# Patient Record
Sex: FEMALE | Race: WHITE
[De-identification: names, ages, dates, MRNs, and addresses within clinical notes are randomized per-mention and may not be internally consistent; named-entity substitution may affect disease eponyms.]

---

## 2017-04-07 ENCOUNTER — HOSPITAL ENCOUNTER (OUTPATIENT)
Dept: HOSPITAL 17 - CPRE | Age: 72
End: 2017-04-07
Attending: ORTHOPAEDIC SURGERY
Payer: MEDICARE

## 2017-04-07 ENCOUNTER — HOSPITAL ENCOUNTER (INPATIENT)
Dept: HOSPITAL 17 - HSDI | Age: 72
LOS: 9 days | Discharge: NURSING, REHAB OR LONG-TERM CARE | DRG: 470 | End: 2017-04-16
Attending: ORTHOPAEDIC SURGERY | Admitting: ORTHOPAEDIC SURGERY
Payer: MEDICARE

## 2017-04-07 VITALS — HEIGHT: 64 IN | WEIGHT: 216.49 LBS | BODY MASS INDEX: 36.96 KG/M2

## 2017-04-07 DIAGNOSIS — Z01.811: ICD-10-CM

## 2017-04-07 DIAGNOSIS — J43.9: ICD-10-CM

## 2017-04-07 DIAGNOSIS — K59.00: ICD-10-CM

## 2017-04-07 DIAGNOSIS — Z01.810: Primary | ICD-10-CM

## 2017-04-07 DIAGNOSIS — M25.50: ICD-10-CM

## 2017-04-07 DIAGNOSIS — M79.609: ICD-10-CM

## 2017-04-07 DIAGNOSIS — I10: ICD-10-CM

## 2017-04-07 DIAGNOSIS — K21.9: ICD-10-CM

## 2017-04-07 DIAGNOSIS — Z01.812: ICD-10-CM

## 2017-04-07 DIAGNOSIS — F11.20: ICD-10-CM

## 2017-04-07 DIAGNOSIS — G89.4: ICD-10-CM

## 2017-04-07 DIAGNOSIS — F32.9: ICD-10-CM

## 2017-04-07 DIAGNOSIS — Z87.891: ICD-10-CM

## 2017-04-07 DIAGNOSIS — E03.9: ICD-10-CM

## 2017-04-07 DIAGNOSIS — Z88.5: ICD-10-CM

## 2017-04-07 DIAGNOSIS — Z96.60: ICD-10-CM

## 2017-04-07 DIAGNOSIS — E66.9: ICD-10-CM

## 2017-04-07 DIAGNOSIS — Z71.3: ICD-10-CM

## 2017-04-07 DIAGNOSIS — M84.352A: ICD-10-CM

## 2017-04-07 DIAGNOSIS — M16.12: Primary | ICD-10-CM

## 2017-04-07 DIAGNOSIS — Z88.0: ICD-10-CM

## 2017-04-07 DIAGNOSIS — M10.9: ICD-10-CM

## 2017-04-07 DIAGNOSIS — M87.9: ICD-10-CM

## 2017-04-07 DIAGNOSIS — R94.31: ICD-10-CM

## 2017-04-07 DIAGNOSIS — Z88.6: ICD-10-CM

## 2017-04-07 DIAGNOSIS — Z88.2: ICD-10-CM

## 2017-04-07 LAB
ALP SERPL-CCNC: 91 U/L (ref 45–117)
ALT SERPL-CCNC: 39 U/L (ref 10–53)
ANION GAP SERPL CALC-SCNC: 9 MEQ/L (ref 5–15)
APTT BLD: 27.3 SEC (ref 24.3–30.1)
AST SERPL-CCNC: 35 U/L (ref 15–37)
BASOPHILS # BLD AUTO: 0 TH/MM3 (ref 0–0.2)
BASOPHILS NFR BLD: 0.5 % (ref 0–2)
BILIRUB SERPL-MCNC: 0.6 MG/DL (ref 0.2–1)
BUN SERPL-MCNC: 9 MG/DL (ref 7–18)
CHLORIDE SERPL-SCNC: 103 MEQ/L (ref 98–107)
COLOR UR: YELLOW
COMMENT (UR): (no result)
CULTURE IF INDICATED: (no result)
EOSINOPHIL # BLD: 0 TH/MM3 (ref 0–0.4)
EOSINOPHIL NFR BLD: 0.1 % (ref 0–4)
ERYTHROCYTE [DISTWIDTH] IN BLOOD BY AUTOMATED COUNT: 14.6 % (ref 11.6–17.2)
ERYTHROCYTE [SEDIMENTATION RATE] IN BLOOD BY WESTERGREN METHOD: 10 MM/HR (ref 0–30)
GFR SERPLBLD BASED ON 1.73 SQ M-ARVRAT: 52 ML/MIN (ref 89–?)
GLUCOSE UR STRIP-MCNC: (no result) MG/DL
HCO3 BLD-SCNC: 28.9 MEQ/L (ref 21–32)
HCT VFR BLD CALC: 44.7 % (ref 35–46)
HEMO FLAGS: (no result)
HGB UR QL STRIP: (no result)
INR PPP: 1 RATIO
KETONES UR STRIP-MCNC: (no result) MG/DL
LYMPHOCYTES # BLD AUTO: 3.1 TH/MM3 (ref 1–4.8)
LYMPHOCYTES NFR BLD AUTO: 37.9 % (ref 9–44)
MCH RBC QN AUTO: 29.1 PG (ref 27–34)
MCHC RBC AUTO-ENTMCNC: 34.2 % (ref 32–36)
MCV RBC AUTO: 85.2 FL (ref 80–100)
MONOCYTES NFR BLD: 8 % (ref 0–8)
MUCOUS THREADS #/AREA URNS LPF: (no result) /LPF
NEUTROPHILS # BLD AUTO: 4.4 TH/MM3 (ref 1.8–7.7)
NEUTROPHILS NFR BLD AUTO: 53.5 % (ref 16–70)
NITRITE UR QL STRIP: (no result)
PLATELET # BLD: 246 TH/MM3 (ref 150–450)
POTASSIUM SERPL-SCNC: 3.6 MEQ/L (ref 3.5–5.1)
PROTHROMBIN TIME: 10.9 SEC (ref 9.8–11.6)
RBC # BLD AUTO: 5.24 MIL/MM3 (ref 4–5.3)
SODIUM SERPL-SCNC: 141 MEQ/L (ref 136–145)
SP GR UR STRIP: 1.02 (ref 1–1.03)
SQUAMOUS #/AREA URNS HPF: 1 /HPF (ref 0–5)
WBC # BLD AUTO: 8.3 TH/MM3 (ref 4–11)

## 2017-04-07 PROCEDURE — 85652 RBC SED RATE AUTOMATED: CPT

## 2017-04-07 PROCEDURE — 85025 COMPLETE CBC W/AUTO DIFF WBC: CPT

## 2017-04-07 PROCEDURE — 85730 THROMBOPLASTIN TIME PARTIAL: CPT

## 2017-04-07 PROCEDURE — 86850 RBC ANTIBODY SCREEN: CPT

## 2017-04-07 PROCEDURE — 71020: CPT

## 2017-04-07 PROCEDURE — 83036 HEMOGLOBIN GLYCOSYLATED A1C: CPT

## 2017-04-07 PROCEDURE — 85610 PROTHROMBIN TIME: CPT

## 2017-04-07 PROCEDURE — 83735 ASSAY OF MAGNESIUM: CPT

## 2017-04-07 PROCEDURE — 93005 ELECTROCARDIOGRAM TRACING: CPT

## 2017-04-07 PROCEDURE — C9290 INJ, BUPIVACAINE LIPOSOME: HCPCS

## 2017-04-07 PROCEDURE — 73502 X-RAY EXAM HIP UNI 2-3 VIEWS: CPT

## 2017-04-07 PROCEDURE — 86901 BLOOD TYPING SEROLOGIC RH(D): CPT

## 2017-04-07 PROCEDURE — 94150 VITAL CAPACITY TEST: CPT

## 2017-04-07 PROCEDURE — 81001 URINALYSIS AUTO W/SCOPE: CPT

## 2017-04-07 PROCEDURE — 84439 ASSAY OF FREE THYROXINE: CPT

## 2017-04-07 PROCEDURE — 80048 BASIC METABOLIC PNL TOTAL CA: CPT

## 2017-04-07 PROCEDURE — 80061 LIPID PANEL: CPT

## 2017-04-07 PROCEDURE — 76000 FLUOROSCOPY <1 HR PHYS/QHP: CPT

## 2017-04-07 PROCEDURE — C1776 JOINT DEVICE (IMPLANTABLE): HCPCS

## 2017-04-07 PROCEDURE — 85027 COMPLETE CBC AUTOMATED: CPT

## 2017-04-07 PROCEDURE — 84443 ASSAY THYROID STIM HORMONE: CPT

## 2017-04-07 PROCEDURE — 36415 COLL VENOUS BLD VENIPUNCTURE: CPT

## 2017-04-07 PROCEDURE — 74177 CT ABD & PELVIS W/CONTRAST: CPT

## 2017-04-07 PROCEDURE — 80053 COMPREHEN METABOLIC PANEL: CPT

## 2017-04-07 PROCEDURE — 86900 BLOOD TYPING SEROLOGIC ABO: CPT

## 2017-04-07 NOTE — RADRPT
EXAM DATE/TIME:  04/07/2017 14:43 

 

[facility] COMPARISON:     

FLUOROSCOPY FOR INTERVENTIONAL PAIN, SPINAL UP TO 1 HR, October 26, 2016, 0:00.

 

                     

INDICATIONS :     

Evaluate for pneumonia, pneumothorax and communicable diseases.  Pre-op total hip replacement.

                     

 

MEDICAL HISTORY :     

None.          

 

SURGICAL HISTORY :     

None.   

 

ENCOUNTER:     

Initial                                        

 

ACUITY:     

1 day      

 

PAIN SCORE:     

0/10

 

LOCATION:     

Bilateral chest 

 

FINDINGS:     

There is elevation of the right hemidiaphragm of uncertain etiology. The lungs are clear. The bony st
ructures are grossly intact.

 

CONCLUSION:     

1. Elevated right hemidiaphragm.

2. No acute cardiopulmonary findings.

 

 

 

 Gerhard Shepherd MD on April 07, 2017 at 15:03           

Board Certified Radiologist.

 This report was verified electronically.

## 2017-04-08 NOTE — EKG
Date Performed: 04/07/2017       Time Performed: 14:04:09

 

PTAGE:      72 years

 

EKG:      Sinus rhythm 

 

 WITH SHORT MA INTERVAL MINIMAL ST DEPRESSION Compared to prior tracing no significant change BORDERL
INE ECG

 

PREVIOUS TRACING       : 09/04/1997 15.23

 

DOCTOR:   Rupesh Lopez  Interpretating Date/Time  04/08/2017 14:33:18

## 2017-04-12 VITALS
SYSTOLIC BLOOD PRESSURE: 153 MMHG | DIASTOLIC BLOOD PRESSURE: 98 MMHG | OXYGEN SATURATION: 94 % | RESPIRATION RATE: 20 BRPM | TEMPERATURE: 99.1 F | HEART RATE: 83 BPM

## 2017-04-12 VITALS
OXYGEN SATURATION: 95 % | TEMPERATURE: 96.4 F | SYSTOLIC BLOOD PRESSURE: 143 MMHG | HEART RATE: 100 BPM | RESPIRATION RATE: 18 BRPM | DIASTOLIC BLOOD PRESSURE: 82 MMHG

## 2017-04-12 PROCEDURE — 0SRB02A REPLACEMENT OF LEFT HIP JOINT WITH METAL ON POLYETHYLENE SYNTHETIC SUBSTITUTE, UNCEMENTED, OPEN APPROACH: ICD-10-PCS | Performed by: ORTHOPAEDIC SURGERY

## 2017-04-12 RX ADMIN — HYDROMORPHONE HYDROCHLORIDE PRN MG: 2 INJECTION INTRAMUSCULAR; INTRAVENOUS; SUBCUTANEOUS at 20:02

## 2017-04-12 RX ADMIN — LISINOPRIL SCH MG: 10 TABLET ORAL at 22:09

## 2017-04-12 RX ADMIN — PHENYTOIN SODIUM SCH MLS/HR: 50 INJECTION INTRAMUSCULAR; INTRAVENOUS at 18:30

## 2017-04-12 RX ADMIN — COLCHICINE SCH MG: 0.6 TABLET, FILM COATED ORAL at 22:08

## 2017-04-12 RX ADMIN — SODIUM CHLORIDE, PRESERVATIVE FREE SCH ML: 5 INJECTION INTRAVENOUS at 21:00

## 2017-04-12 NOTE — RADRPT
EXAM DATE/TIME:  04/12/2017 15:53 

 

[facility] COMPARISON:     

No previous studies available for comparison.

 

                     

INDICATIONS :     

Left total hip replacement.

                     

 

MEDICAL HISTORY :     

None.          

 

SURGICAL HISTORY :     

None.   

 

ENCOUNTER:     

Initial                                        

 

ACUITY:     

1 day      

 

PAIN SCORE:     

Non-responsive.

 

LOCATION:     

Left  hip.

 

FINDINGS:     

3 spot intraoperative fluoroscopic views of the left hip demonstrate a left total hip arthroplasty. F
emoral and acetabular components appear well-seated.

 

CONCLUSION:     

Postoperative changes left total hip arthroplasty.

 

 

 

 Mario Love MD on April 12, 2017 at 17:17           

Board Certified Radiologist.

 This report was verified electronically.

## 2017-04-12 NOTE — RADRPT
EXAM DATE/TIME:  04/12/2017 17:47 

 

[facility] COMPARISON:     

HIP LEFT (AP&LAT 2/3VWS) WO AP PELVIS, April 12, 2017, 15:53.

 

                     

INDICATIONS :     

Status post op total left hip arthroplasty.

                     

 

MEDICAL HISTORY :     

None.          

 

SURGICAL HISTORY :     

None.   

 

ENCOUNTER:     

Subsequent                                        

 

ACUITY:     

1 day      

 

PAIN SCORE:     

0/10

 

LOCATION:     

Left  Hip

 

FINDINGS:     

Patient is immediately postoperative left bipolar hip arthroplasty which appears intact and normally 
aligned. No evidence of hardware failure or other acute complication.

 

CONCLUSION:     

Expected radiographic appearance after left bipolar hip arthroplasty. No acute complication demonstra
katie.

 

 

 

 Wesly Hall MD on April 12, 2017 at 18:25           

Board Certified Radiologist.

 This report was verified electronically.

## 2017-04-12 NOTE — HHI.DCPOC
Discharge Care Plan


Diagnosis:  


(1) Status post total hip replacement, left


(2) Stress fracture of left hip


Your Health Problems Are:     Difficulty with ADL


Goals to Promote Your Health


* To prevent worsening of your condition and complications


* To maintain your health at the optimal level


Directions to Meet Your Goals


*** Take your medications as prescribed


*** Follow your dietary instruction


*** Follow activity as directed








*** Keep your appointments as scheduled


*** Take your immunizations and boosters as scheduled


*** If your symptoms worsen call your PCP, if no PCP go to Urgent Care Center 

or Emergency Room***


*** Smoking is Dangerous to Your Health. Avoid second hand smoke***


***Call the 24-hour hour crisis hotline for domestic abuse at 1-468.261.8353***








Gerhard Munguia Apr 12, 2017 18:01

## 2017-04-12 NOTE — HHI.FF
Face to Face Verification


Diagnosis:  


(1) Status post total hip replacement, left


(2) Stress fracture of left hip


Physical Therapy


Gait training, Transfer training, bed to chair


Hip:  Total hip


Left LE Weight Bearing:  WB as tolerated


Left LE Range of Motion:  Active ROM





Nursing


Nursing:  Lovenox teaching, Dressing changes


Dressing Changes:  Daily dressing change








I have seen patient Tamika Hartman on 4/12/17. My clinical findings support 

the need for the requested home health care services because:


Limited ability to care for self


High risk of falls








I certify that my clinical findings support that this patient is homebound 

because:


Post-op weakness


Unsteady gait/balance








Gerhard Munguia Apr 12, 2017 18:02

## 2017-04-12 NOTE — PD.OP
cc:   Konrad Jessica MD


__________________________________________________





Operative Report


Date of Surgery:  Apr 12, 2017


Preoperative Diagnosis:  


Left hip avascular necrosis.  Left hip severe osteoarthritis.


Postoperative Diagnosis:  


Same


Procedure:


Left total hip arthroplasty


Anesthesia:


Spinal


Surgeon:


Konrad Jessica


Assistant(s):


ALFREDO Zurita


 


The surgical procedure was assisted by my Advanced Registered Nurse 

Practitioner.  My ARNP presence was necessary throughout this case for the 

manipulation and positioning of the surgical extremity. My ARNP was assisting 

me throughout the duration of this procedure.  The skill set of an Advance 

Registered Nurse Practitioner was medically necessary to complete this 

procedure.  During the surgical case, the surgical tech was working at the back 

table and the Advance Registered Nurse Practitioner was directly assisting me.


Operation and Findings:





IMPLANT DESCRIPTION:


1. [place] Gription Cup, acetabular size 52.


2. [place] AltrX polyethylene, neutral.


4. [place] femoral stem size 11, no collar, standard offset.


5. Femoral head/neck metal, 36, 1.5.





ESTIMATED BLOOD LOSS:


200 cc.





JUSTIFICATION FOR PROCEDURE:





The patient has end-stage osteoarthritis to the hip. There is an attached 

conservative measures pathway form in the chart that describes the nonoperative 

measures that were undertaken prior to consideration of surgical management. 

The patient understood the risks and benefits of surgical management. See my 

office notes for further details.





PROCEDURE:





The patient was brought back to the operative theatre. Adequate anesthesia was 

obtained.  The patient received intravenous Ancef and vancomycin.  Note the 

patient was given a test dose prior to giving the Ancef.  The patient was 

carefully placed on the operative table.  The lower extremity was prepped and 

draped in the usual sterile fashion. Fluoroscopic images were obtained.  We 

made a standard anterior incision over the hip. We dissected through the TFL 

fascia, exposing the anterior capsule. Arthrotomy was performed in a T-shaped 

fashion.  The capsule was tagged with a #2 FiberWire. End-stage arthritis was 

identified.  No significant effusion was noted.





Osteotomy was performed through the femoral neck exposing the acetabulum.  

Remnants of the labrum were resected and osteophytes were removed. We 

sequentially reamed the acetabulum.  We trialed the hip and placed the final 

cup into position.  This was done under fluoroscopic guidance to obtain the 

appropriate inclination and anteversion.  A manhole cover was placed into the 

acetabular component.  We then placed the final polyethylene into position and 

confirmed that it was well seated.





Capsular attachments on the calcar and the inner aspect of the greater 

trochanter were resected. On the proximal aspect of the femur we used a rongeur

, and box osteotome.  Note that when we exposed the proximal femur after the 

box osteotome we did see edematous cancellous bone within the femoral neck 

which was consistent with the stress reaction/fracture that was seen on the 

most recent MRI.  We did thoroughly irrigate this area.  We followed this by 

using sequential broaches and lateralizing rasp .  We calcar planed the 

proximal femur.  Then thoroughly irrigated the wound.  We trialed the hip with 

the appropriate size stem.  We placed the final stem in to position and trialed 

again.  The hip was stable while it was externally rotated 70 degrees when the 

leg was lowered to the floor.





The final head was applied, and final fluoroscopic images were obtained. The 

wound was thoroughly irrigated again. Interarticular injection of liposomal 

bupivacaine was given. The capsule was closed with #2 FiberWire and #1 Vicryl. 

The deep fascia was closed with a #2 Stratafix, followed by 2-0 Vicryl in the 

skin and staples.





Postop plan is to weight-bear as tolerated.  DVT prophylaxis will be performed 

with SCDs, VINAY hose, early mobilization, and Lovenox followed by aspirin.








Konrad Jessica MD Apr 12, 2017 17:24

## 2017-04-13 VITALS
DIASTOLIC BLOOD PRESSURE: 70 MMHG | OXYGEN SATURATION: 97 % | RESPIRATION RATE: 16 BRPM | SYSTOLIC BLOOD PRESSURE: 138 MMHG | TEMPERATURE: 98.7 F | HEART RATE: 79 BPM

## 2017-04-13 VITALS
OXYGEN SATURATION: 92 % | DIASTOLIC BLOOD PRESSURE: 84 MMHG | RESPIRATION RATE: 17 BRPM | TEMPERATURE: 98.5 F | HEART RATE: 83 BPM | SYSTOLIC BLOOD PRESSURE: 156 MMHG

## 2017-04-13 VITALS
DIASTOLIC BLOOD PRESSURE: 59 MMHG | SYSTOLIC BLOOD PRESSURE: 130 MMHG | RESPIRATION RATE: 18 BRPM | TEMPERATURE: 98.4 F | OXYGEN SATURATION: 95 % | HEART RATE: 73 BPM

## 2017-04-13 VITALS
HEART RATE: 83 BPM | SYSTOLIC BLOOD PRESSURE: 125 MMHG | DIASTOLIC BLOOD PRESSURE: 74 MMHG | TEMPERATURE: 97.8 F | OXYGEN SATURATION: 91 % | RESPIRATION RATE: 16 BRPM

## 2017-04-13 VITALS
OXYGEN SATURATION: 93 % | HEART RATE: 80 BPM | SYSTOLIC BLOOD PRESSURE: 150 MMHG | TEMPERATURE: 98 F | RESPIRATION RATE: 18 BRPM | DIASTOLIC BLOOD PRESSURE: 97 MMHG

## 2017-04-13 VITALS
SYSTOLIC BLOOD PRESSURE: 139 MMHG | DIASTOLIC BLOOD PRESSURE: 88 MMHG | HEART RATE: 84 BPM | TEMPERATURE: 97 F | OXYGEN SATURATION: 94 % | RESPIRATION RATE: 17 BRPM

## 2017-04-13 VITALS
HEART RATE: 86 BPM | DIASTOLIC BLOOD PRESSURE: 87 MMHG | RESPIRATION RATE: 16 BRPM | OXYGEN SATURATION: 93 % | SYSTOLIC BLOOD PRESSURE: 149 MMHG | TEMPERATURE: 98.1 F

## 2017-04-13 VITALS — OXYGEN SATURATION: 94 %

## 2017-04-13 VITALS — OXYGEN SATURATION: 96 %

## 2017-04-13 LAB
ERYTHROCYTE [DISTWIDTH] IN BLOOD BY AUTOMATED COUNT: 13.8 % (ref 11.6–17.2)
HCT VFR BLD CALC: 37.4 % (ref 35–46)
MCH RBC QN AUTO: 29.3 PG (ref 27–34)
MCHC RBC AUTO-ENTMCNC: 34.4 % (ref 32–36)
MCV RBC AUTO: 85.2 FL (ref 80–100)
PLATELET # BLD: 176 TH/MM3 (ref 150–450)
RBC # BLD AUTO: 4.39 MIL/MM3 (ref 4–5.3)
REVIEW FLAG: (no result)
WBC # BLD AUTO: 10 TH/MM3 (ref 4–11)

## 2017-04-13 RX ADMIN — PHENYTOIN SODIUM SCH MLS/HR: 50 INJECTION INTRAMUSCULAR; INTRAVENOUS at 23:13

## 2017-04-13 RX ADMIN — ENOXAPARIN SODIUM SCH MG: 40 INJECTION SUBCUTANEOUS at 16:35

## 2017-04-13 RX ADMIN — LEVOTHYROXINE SODIUM SCH MCG: 25 TABLET ORAL at 08:21

## 2017-04-13 RX ADMIN — LEVOTHYROXINE SODIUM SCH MCG: 25 TABLET ORAL at 06:18

## 2017-04-13 RX ADMIN — PANTOPRAZOLE SODIUM SCH MG: 20 TABLET, DELAYED RELEASE ORAL at 08:21

## 2017-04-13 RX ADMIN — LISINOPRIL SCH MG: 20 TABLET ORAL at 08:21

## 2017-04-13 RX ADMIN — LISINOPRIL SCH MG: 10 TABLET ORAL at 21:16

## 2017-04-13 RX ADMIN — MULTIPLE VITAMINS W/ MINERALS TAB SCH TAB: TAB at 21:00

## 2017-04-13 RX ADMIN — PHENYTOIN SODIUM SCH MLS/HR: 50 INJECTION INTRAMUSCULAR; INTRAVENOUS at 13:13

## 2017-04-13 RX ADMIN — ALLOPURINOL SCH MG: 100 TABLET ORAL at 08:21

## 2017-04-13 RX ADMIN — HYDROMORPHONE HYDROCHLORIDE PRN MG: 2 INJECTION INTRAMUSCULAR; INTRAVENOUS; SUBCUTANEOUS at 00:00

## 2017-04-13 RX ADMIN — MULTIPLE VITAMINS W/ MINERALS TAB SCH TAB: TAB at 21:15

## 2017-04-13 RX ADMIN — CITALOPRAM SCH MG: 20 TABLET, FILM COATED ORAL at 08:22

## 2017-04-13 RX ADMIN — COLCHICINE SCH MG: 0.6 TABLET, FILM COATED ORAL at 21:00

## 2017-04-13 RX ADMIN — SODIUM CHLORIDE, PRESERVATIVE FREE SCH ML: 5 INJECTION INTRAVENOUS at 21:00

## 2017-04-13 RX ADMIN — SODIUM CHLORIDE, PRESERVATIVE FREE SCH ML: 5 INJECTION INTRAVENOUS at 08:22

## 2017-04-13 RX ADMIN — PHENYTOIN SODIUM SCH MLS/HR: 50 INJECTION INTRAMUSCULAR; INTRAVENOUS at 03:45

## 2017-04-13 RX ADMIN — BUDESONIDE AND FORMOTEROL FUMARATE DIHYDRATE SCH PUFF: 160; 4.5 AEROSOL RESPIRATORY (INHALATION) at 08:22

## 2017-04-13 RX ADMIN — DOCUSATE SODIUM SCH MG: 100 CAPSULE, LIQUID FILLED ORAL at 21:15

## 2017-04-13 RX ADMIN — COLCHICINE SCH MG: 0.6 TABLET, FILM COATED ORAL at 08:22

## 2017-04-13 RX ADMIN — POVIDONE-IODINE SCH APPLIC: 7.5 SOLUTION TOPICAL at 10:00

## 2017-04-13 NOTE — PD.ORT.PN
Subjective


Post Op Day #:  1


Subjective Remarks


The patient is having mild pain to the left hip.  Patient is ambulatory and 

voiding.





Objective


Vitals





 Vital Signs








  Date Time  Temp Pulse Resp B/P Pulse Ox O2 Delivery O2 Flow Rate FiO2


 


4/13/17 08:53     94   21


 


4/13/17 08:00 98.0 80 18 150/97 93   


 


4/13/17 06:06     96   21


 


4/13/17 04:00 97.0 84 17 139/88 94   


 


4/13/17 00:00 98.1 86 16 149/87 93   


 


4/12/17 21:23 96.4 100 18 143/82 95   


 


4/12/17 20:45 97.7 88 15 150/89 95 Nasal Cannula 2 


 


4/12/17 20:32   15     


 


4/12/17 20:00  86 15 153/85 94 Nasal Cannula 2 


 


4/12/17 19:30  85 15 155/86 94 Nasal Cannula 2 


 


4/12/17 19:00 97.8 83 15 159/82 94 Nasal Cannula 2 


 


4/12/17 18:45  84 14 163/88 96 Nasal Cannula 3 


 


4/12/17 18:30 97.2 85 14 170/96 95 Nasal Cannula 3 


 


4/12/17 18:15  89 13 180/98 95 Nasal Cannula 3 


 


4/12/17 18:00 96.9 88 13 166/96 94 Nasal Cannula 3 


 


4/12/17 17:45  93 13 155/97 96 Simple Mask 8 


 


4/12/17 17:35 96.8       


 


4/12/17 17:34 96.8 82 12 144/99 94 Simple Mask 8 








 I/O








 4/12/17 4/12/17 4/12/17 4/13/17 4/13/17 4/13/17





 07:00 15:00 23:00 07:00 15:00 23:00


 


Intake Total   1505 ml 1045 ml  


 


Output Total   800 ml 500 ml  


 


Balance   705 ml 545 ml  


 


      


 


Intake Oral   240 ml 360 ml  


 


IV Total   465 ml 685 ml  


 


Other   800 ml   


 


Output Urine Total   550 ml 500 ml  


 


Estimated Blood Loss   250 ml   


 


# Bowel Movements    0  








Result Diagram:  


4/13/17 0442





Procedures


Left EUSEBIO


Objective Remarks


The patient's dressing was changed with scant serosanguineous drainage.  

Incision is well approximated with surgical clips intact.  No redness or s/s of 

infection.  Calf is soft and nontender.  EHL/TA/G intact.  2+ pedal pulse.  

Minimal swelling.  + SILT.





Assessment & Plan


Ortho Post Op Day #:  1


Problem List:  


Assessment and Plan


POD #1:  Left EUSEBIO





1.  WBAT LLE


2.  Lovenox for DVT prophylaxis


3.  Ice to the left hip PRN


4.  Anticipatory discharge to SNF ([facility]) on Saturday.








Gerhard Munguia Apr 13, 2017 12:40

## 2017-04-13 NOTE — PD.CONS
HPI


Service


[facility]


Consult Requested By


Doctor Konrad Jessica


Reason for Consult


Medical management


Primary Care Physician


Juan Manuel Stack MD, PhD


Diagnoses:  


History of Present Illness


This is a pleasant 71 y/o Female who was brought in for elective surgery by 

Doctor Konrad Jessica with Diagnosis


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 

Total Hip arthroplasty, as we know 


the patient has chronic Pain syndrome and narcotic dependence, Pain medicines 

handled by Orthopedic surgery


she has Obesity, Gout, Depression, Hypothyroidism, Hypertension, COPD, Emphysema

, Complex Regional Pain


syndrome on left knee and left arm, Multiple surgeries, status post Surgery and 

no Complaint, she already elected


a rehab facility. her friend Miss Aniyah Oneilak with her at this time. she 

has chronic pain on low back area and


left knee managed with injections, until performed this procedure.





Review of Systems


Musculoskeletal:  COMPLAINS OF: Joint pain





Past Family Social History


Allergies:  


Coded Allergies:  


     Tylenol (Verified  Allergy, Severe, 4/12/17)


     Oxycontin (Verified  Allergy, Intermediate, Rash, 4/12/17)


     Penicillin (Verified  Allergy, Unknown, 4/12/17)


     Sulfa (Verified  Allergy, Unknown, 4/12/17)


Uncoded Allergies:  


     AMOBARTITAL (Allergy, Severe, combative behavior, 4/12/17)


     NOVACAINE (Allergy, Severe, Anaphylaxis, 3/25/16)


     SODIUM PENTOTHAL (Allergy, Severe, skin blisters, 4/12/17)


Past Medical History


Gout


Depression


Hypothyroidism


Hypertension


COPD/Emphysema


Chronic pain syndrome


Complex Regional Pain syndrome


Past Surgical History


Multiple Surgeries status post Trauma specially left arm, left knee


Spine surgery 3 years ago


Jaw fracture x 2


Tonsillectomy and Adenoids 


Appendectomy


Bartholin's cyst x 8


Tubal ligation


Exploratory laparotomy


Reported Medications





Reported Meds & Active Scripts


Active


Aspirin 325 Mg Tab 325 Mg PO DAILY


     Start Aspirin after Lovenox is completed.


Lovenox Inj (Enoxaparin Sodium) 40 Mg/0.4 Ml Syr 40 Mg SQ DAILY


     Start Aspirin after Lovenox is completed.


Hydromorphone (Hydromorphone HCl) 2 Mg Tab 1-2 Tab PO Q4H PRN


Reported


Vitamin D (Cholecalciferol) 400 Unit/Ml Drops 400 Units PO DAILY


Lisinopril 10 Mg Tab 10 Mg PO HS


Lisinopril 20 Mg Tab 20 Mg PO DAILY


Allopurinol 100 Mg Tab 100 Mg PO DAILY


Colchicine 0.6 Mg Tab 0.6 Mg PO BID


E-400 (Vitamin E) 400 Unit Cap 1 Units PO DAILY


Hysingla ER (Hydrocodone ER) 60 Mg Neal 1 Tab PO DAILY


Symbicort Inh (Budesonide/Formoterol Fumarate) 160-4.5 Mcg/Act Aero 2 Puff INH 

DAILY


Acidophilus (Probiotic Product) 1 Chew   


Calcium 500 + D (Calcium Carbonate-Vitamin D) 500-125 Mg-Unit Tab 1 Tab PO DAILY


Citalopram (Citalopram Hydrobromide) 10 Mg Tab 10 Mg PO DAILY


Nexium (Esomeprazole DR) 20 Mg Capdr 20 Mg PO DAILY


Levothyroxine (Levothyroxine Sodium) 25 Mcg Tab 25 Mcg PO DAILY


Active Ordered Medications





 Current Medications








 Medications


  (Trade)  Dose


 Ordered  Sig/Elizabeth


 Route  Start Time


 Stop Time Status Last Admin


 


  (Betadine 7.5%


 Scrub)  1 applic  ONCE


 TOPICAL  4/12/17 10:00


 4/15/17 09:59   


 


 


  (Zyloprim)  100 mg  DAILY


 PO  4/13/17 09:00


    4/13/17 08:21


 


 


  (Symbicort


 160-4.5 Inh)  2 puff  DAILY


 INH  4/13/17 09:00


     


 


 


  (CeleXA)  10 mg  DAILY


 PO  4/13/17 09:00


    4/13/17 08:22


 


 


  (Colchicine)  0.6 mg  BID


 PO  4/12/17 21:00


    4/13/17 08:22


 


 


  (Synthroid)  25 mcg  DAILY@06


 PO  4/13/17 06:00


    4/13/17 08:21


 


 


  (Prinivil)  10 mg  HS


 PO  4/12/17 21:00


    4/12/17 22:09


 


 


  (Prinivil)  20 mg  DAILY


 PO  4/13/17 09:00


    4/13/17 08:21


 


 


  (Protonix)  20 mg  DAILY


 PO  4/13/17 09:00


    4/13/17 08:21


 


 


 Patient Own


 Medication  PT OWN


 MED:


 Hydrocod...  DAILY


 PO  4/13/17 09:00


   Hold  


 


 


  (NS 1000 ml Inj)  1,000 ml @ 


 100 mls/hr  Q10H


 IV  4/12/17 17:13


    4/13/17 03:45


 


 


  (NS Flush)  2 ml  UNSCH  PRN


 IVF  4/12/17 17:15


     


 


 


  (NS Flush)  2 ml  BID


 IVF  4/12/17 21:00


    4/13/17 08:22


 


 


  (Lovenox Inj)  40 mg  Q24H


 SQ  4/13/17 16:30


 4/22/17 16:31   


 


 


  (Theragran M Tab)  1 tab  BID


 PO  4/13/17 21:00


 6/12/17 20:59   


 


 


  (Zofran Inj)  4 mg  Q6H  PRN


 IVP  4/12/17 17:15


    4/13/17 05:21


 


 


  (Colace)  100 mg  BID


 PO  4/13/17 21:00


     


 


 


  (Mag-Al Plus


 Susp Liq)  30 ml  Q6H  PRN


 PO  4/12/17 17:15


     


 


 


  (Ambien)  5 mg  HS  PRN


 PO  4/12/17 17:15


     


 


 


  (Dulcolax Supp)  10 mg  DAILY  PRN


 RECTAL  4/12/17 17:15


     


 


 


  (Milk Of


 Magnesia Liq)  30 ml  DAILY  PRN


 PO  4/12/17 17:15


     


 


 


  (Narcan Inj)  0.4 mg  UNSCH  PRN


 IV  4/12/17 17:15


     


 


 


  (Benadryl Inj)  25 mg  Q6H  PRN


 IV  4/12/17 17:15


     


 


 


  (Dilaudid Pf Inj)  1 mg  Q3H  PRN


 IV  4/12/17 17:30


    4/13/17 00:00


 


 


  (Dilaudid)  2 mg  Q6H  PRN


 PO  4/12/17 17:30


     


 


 


  (Dilaudid)  4 mg  Q4H  PRN


 PO  4/12/17 17:30


    4/13/17 10:11


 


 


 Miscellaneous


 Information  ALL


 NURSING


 DEPARTME...  UNSCH  PRN


 .XX  4/12/17 18:30


 4/13/17 18:29   


 








Family History


Mother status post Peritoneal Cancer


Father Metastatic prostate Cancer


Brother with bladder cancer


Social History


Lives by herself after her  passed away 2 years ago, smoked since her 19 

years


of age until 1990, denies other toxic habits, she is disable.





Physical Exam


Vital Signs





 Vital Signs








  Date Time  Temp Pulse Resp B/P Pulse Ox O2 Delivery O2 Flow Rate FiO2


 


4/13/17 08:53     94   21


 


4/13/17 08:00 98.0 80 18 150/97 93   


 


4/13/17 06:06     96   21


 


4/13/17 04:00 97.0 84 17 139/88 94   


 


4/13/17 00:00 98.1 86 16 149/87 93   


 


4/12/17 21:23 96.4 100 18 143/82 95   


 


4/12/17 20:45 97.7 88 15 150/89 95 Nasal Cannula 2 


 


4/12/17 20:32   15     


 


4/12/17 20:00  86 15 153/85 94 Nasal Cannula 2 


 


4/12/17 19:30  85 15 155/86 94 Nasal Cannula 2 


 


4/12/17 19:00 97.8 83 15 159/82 94 Nasal Cannula 2 


 


4/12/17 18:45  84 14 163/88 96 Nasal Cannula 3 


 


4/12/17 18:30 97.2 85 14 170/96 95 Nasal Cannula 3 


 


4/12/17 18:15  89 13 180/98 95 Nasal Cannula 3 


 


4/12/17 18:00 96.9 88 13 166/96 94 Nasal Cannula 3 


 


4/12/17 17:45  93 13 155/97 96 Simple Mask 8 


 


4/12/17 17:35 96.8       


 


4/12/17 17:34 96.8 82 12 144/99 94 Simple Mask 8 








Physical Exam


GENERAL: Obesity, well-developed patient, in no apparent distress.


SKIN: No rashes, ecchymoses or lesions. Cool and dry.


HEAD: Atraumatic. Normocephalic. No temporal or scalp tenderness.


EYES: Pupils equal round and reactive. Extraocular motions intact. No scleral 

icterus. No injection or drainage. 


ENT: Nose without bleeding, purulent drainage or septal hematoma. Throat 

without erythema, tonsillar hypertrophy or exudate. Uvula midline. Airway 

patent.


NECK: Trachea midline. No JVD or lymphadenopathy. Supple, nontender, no 

meningeal signs.


CARDIOVASCULAR: Regular rate and rhythm without murmurs, gallops, or rubs. 


RESPIRATORY: Decreased breath sounds bilateral, no wheezing or crackles. 


GASTROINTESTINAL: Abdomen soft, non-tender, nondistended. No hepato-splenomegaly

, or palpable masses. No guarding.


MUSCULOSKELETAL: Extremities without clubbing, cyanosis, left hip dressed. 

multiple scars on left arm and left leg. 


NEUROLOGICAL: Awake and alert. no focal deficits.


Laboratory





Laboratory Tests








Test 4/13/17





 04:42


 


White Blood Count 10.0 


 


Red Blood Count 4.39 


 


Hemoglobin 12.9 


 


Hematocrit 37.4 


 


Mean Corpuscular Volume 85.2 


 


Mean Corpuscular Hemoglobin 29.3 


 


Mean Corpuscular Hemoglobin 34.4 





Concent 


 


Red Cell Distribution Width 13.8 


 


Platelet Count 176 


 


Mean Platelet Volume 9.3 








Result Diagram:  


4/13/17 0442





Imaging





Last Impressions








Hip and Pelvis X-Ray 4/12/17 0000 Signed





Impressions: 





 Service Date/Time:  Wednesday, April 12, 2017 17:47 - CONCLUSION:  Expected 





 radiographic appearance after left bipolar hip arthroplasty. No acute 





 complication demonstrated.     Wesly Hall MD 


 


Hip X-Ray 4/12/17 0000 Signed





Impressions: 





 Service Date/Time:  Wednesday, April 12, 2017 15:53 - CONCLUSION:  

Postoperative 





 changes left total hip arthroplasty.     Mario Love MD 











Assessment and Plan


Assessment and Plan


1. 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 

total hip arthroplasty by Doctor Konrad Jessica 


2. Chronic Pain syndrome and narcotic dependence to continue her Home medicines


3. Obesity strongly recommended diet and exercise as outpatient


4. COPD Emphysema to continue Bronchodilator, Mucolytic and Incentive 

spirometry. 


5. Gout to continue home medicines


6. Hypothyroidism continue Hormonal replacement


7. Hypertension Mild uncontrol continue Home medicines clonidine as needed for 

systolic blood pressure over


    160 mm Hg. 


8. Complex Regional Pain syndrome on left knee and left arm, Multiple surgeries.





DVT prophylaxis with Lovenox





Appreciated doctor Konrad Jessica for the opportunity to be in this case.


Code Status


Full Code.


Discussed Condition With


Patient and her Friend in the room Miss Aniyah Gonzalez all questions 

answered to the best of 


my abilities.








Moe Crockett MD Apr 13, 2017 12:24

## 2017-04-14 VITALS
HEART RATE: 82 BPM | DIASTOLIC BLOOD PRESSURE: 83 MMHG | OXYGEN SATURATION: 94 % | RESPIRATION RATE: 17 BRPM | TEMPERATURE: 98.4 F | SYSTOLIC BLOOD PRESSURE: 150 MMHG

## 2017-04-14 VITALS
OXYGEN SATURATION: 94 % | DIASTOLIC BLOOD PRESSURE: 79 MMHG | RESPIRATION RATE: 16 BRPM | SYSTOLIC BLOOD PRESSURE: 134 MMHG | TEMPERATURE: 98.9 F | HEART RATE: 70 BPM

## 2017-04-14 VITALS
DIASTOLIC BLOOD PRESSURE: 79 MMHG | SYSTOLIC BLOOD PRESSURE: 153 MMHG | OXYGEN SATURATION: 93 % | RESPIRATION RATE: 17 BRPM | HEART RATE: 81 BPM | TEMPERATURE: 97.1 F

## 2017-04-14 VITALS
RESPIRATION RATE: 16 BRPM | HEART RATE: 76 BPM | TEMPERATURE: 99.6 F | SYSTOLIC BLOOD PRESSURE: 146 MMHG | DIASTOLIC BLOOD PRESSURE: 73 MMHG | OXYGEN SATURATION: 93 %

## 2017-04-14 VITALS
RESPIRATION RATE: 18 BRPM | HEART RATE: 80 BPM | SYSTOLIC BLOOD PRESSURE: 163 MMHG | TEMPERATURE: 98.1 F | DIASTOLIC BLOOD PRESSURE: 93 MMHG | OXYGEN SATURATION: 93 %

## 2017-04-14 VITALS
TEMPERATURE: 98.3 F | OXYGEN SATURATION: 92 % | HEART RATE: 82 BPM | RESPIRATION RATE: 17 BRPM | SYSTOLIC BLOOD PRESSURE: 149 MMHG | DIASTOLIC BLOOD PRESSURE: 79 MMHG

## 2017-04-14 VITALS — OXYGEN SATURATION: 97 %

## 2017-04-14 LAB
ANION GAP SERPL CALC-SCNC: 8 MEQ/L (ref 5–15)
BUN SERPL-MCNC: 10 MG/DL (ref 7–18)
CHLORIDE SERPL-SCNC: 105 MEQ/L (ref 98–107)
ERYTHROCYTE [DISTWIDTH] IN BLOOD BY AUTOMATED COUNT: 14 % (ref 11.6–17.2)
GFR SERPLBLD BASED ON 1.73 SQ M-ARVRAT: 56 ML/MIN (ref 89–?)
HCO3 BLD-SCNC: 30.5 MEQ/L (ref 21–32)
HCT VFR BLD CALC: 37.9 % (ref 35–46)
HDLC SERPL-MCNC: 48.6 MG/DL (ref 40–60)
HEMOGLOBIN A1A: 0.8 %
HEMOGLOBIN A1B: 1.9 %
HEMOGLOBIN AO: 86.2 %
HEMOGLOBIN LA1C: 1.9 %
HEMOGLOBIN P3: 3.6 %
LDLC SERPL-MCNC: 92 MG/DL (ref 0–99)
MAGNESIUM SERPL-MCNC: 2.2 MG/DL (ref 1.5–2.5)
MCH RBC QN AUTO: 28.5 PG (ref 27–34)
MCHC RBC AUTO-ENTMCNC: 33.1 % (ref 32–36)
MCV RBC AUTO: 86.3 FL (ref 80–100)
PLATELET # BLD: 170 TH/MM3 (ref 150–450)
POTASSIUM SERPL-SCNC: 4 MEQ/L (ref 3.5–5.1)
RBC # BLD AUTO: 4.39 MIL/MM3 (ref 4–5.3)
REVIEW FLAG: (no result)
SODIUM SERPL-SCNC: 143 MEQ/L (ref 136–145)
T4 FREE SERPL-MCNC: 1.06 NG/DL (ref 0.76–1.46)
WBC # BLD AUTO: 12.1 TH/MM3 (ref 4–11)

## 2017-04-14 RX ADMIN — DOCUSATE SODIUM SCH MG: 100 CAPSULE, LIQUID FILLED ORAL at 22:11

## 2017-04-14 RX ADMIN — MULTIPLE VITAMINS W/ MINERALS TAB SCH TAB: TAB at 21:00

## 2017-04-14 RX ADMIN — BUDESONIDE AND FORMOTEROL FUMARATE DIHYDRATE SCH PUFF: 160; 4.5 AEROSOL RESPIRATORY (INHALATION) at 09:00

## 2017-04-14 RX ADMIN — POVIDONE-IODINE SCH APPLIC: 7.5 SOLUTION TOPICAL at 09:43

## 2017-04-14 RX ADMIN — COLCHICINE SCH MG: 0.6 TABLET, FILM COATED ORAL at 08:38

## 2017-04-14 RX ADMIN — MULTIPLE VITAMINS W/ MINERALS TAB SCH TAB: TAB at 08:35

## 2017-04-14 RX ADMIN — CITALOPRAM SCH MG: 20 TABLET, FILM COATED ORAL at 08:35

## 2017-04-14 RX ADMIN — SODIUM CHLORIDE, PRESERVATIVE FREE SCH ML: 5 INJECTION INTRAVENOUS at 09:00

## 2017-04-14 RX ADMIN — ALLOPURINOL SCH MG: 100 TABLET ORAL at 08:35

## 2017-04-14 RX ADMIN — PHENYTOIN SODIUM SCH MLS/HR: 50 INJECTION INTRAMUSCULAR; INTRAVENOUS at 09:13

## 2017-04-14 RX ADMIN — PANTOPRAZOLE SODIUM SCH MG: 20 TABLET, DELAYED RELEASE ORAL at 08:35

## 2017-04-14 RX ADMIN — LISINOPRIL SCH MG: 10 TABLET ORAL at 22:11

## 2017-04-14 RX ADMIN — SODIUM CHLORIDE, PRESERVATIVE FREE SCH ML: 5 INJECTION INTRAVENOUS at 21:00

## 2017-04-14 RX ADMIN — LISINOPRIL SCH MG: 20 TABLET ORAL at 08:35

## 2017-04-14 RX ADMIN — COLCHICINE SCH MG: 0.6 TABLET, FILM COATED ORAL at 21:00

## 2017-04-14 RX ADMIN — DOCUSATE SODIUM SCH MG: 100 CAPSULE, LIQUID FILLED ORAL at 08:35

## 2017-04-14 RX ADMIN — ENOXAPARIN SODIUM SCH MG: 40 INJECTION SUBCUTANEOUS at 16:33

## 2017-04-14 RX ADMIN — LEVOTHYROXINE SODIUM SCH MCG: 25 TABLET ORAL at 06:00

## 2017-04-14 NOTE — PD.ORT.PN
Subjective


Post Op Day #:  2


Subjective Remarks


The patient is resting in bed with moderate pain.  Patient is ambulatory.  

Today is better than yesterday per patient.





Objective


Vitals





 Vital Signs








  Date Time  Temp Pulse Resp B/P Pulse Ox O2 Delivery O2 Flow Rate FiO2


 


4/14/17 12:12 99.6 76 16 146/73 93   


 


4/14/17 07:55     97   21


 


4/14/17 07:46 98.1 80 18 163/93 93   


 


4/14/17 00:00 97.1 81 17 153/79 93   


 


4/13/17 20:25 98.4 73 18 130/59 95   


 


4/13/17 20:20 97.8 83 16 125/74 91   


 


4/13/17 18:58      Room Air  


 


4/13/17 16:20 98.5 83 17 156/84 92   








 I/O








 4/13/17 4/13/17 4/13/17 4/14/17 4/14/17 4/14/17





 07:00 15:00 23:00 07:00 15:00 23:00


 


Intake Total 1045 ml 1080 ml 360 ml 240 ml  


 


Output Total 500 ml     


 


Balance 545 ml 1080 ml 360 ml 240 ml  


 


      


 


Intake Oral 360 ml 1080 ml 360 ml 240 ml  


 


IV Total 685 ml     


 


Output Urine Total 500 ml     


 


# Voids  8 1 2  


 


# Bowel Movements 0  0 0  








Result Diagram:  


4/14/17 0516 4/14/17 0516





Procedures


Left EUSEBIO


Objective Remarks


The patient's dressing is C/D/I.  Calf is soft and nontender.  EHL/TA/G intact.

  2+ pedal pulse.  Minimal swelling.  + SILT.





Assessment & Plan


Ortho Post Op Day #:  2


Problem List:  


Assessment and Plan


POD #2:  Left EUSEBIO





1.  WBAT LLE


2.  Lovenox for DVT prophylaxis


3.  Ice to the left hip PRN


4.  Anticipatory discharge to SNF ([facility]) on Saturday.








Gerhard Munguia Apr 14, 2017 15:34

## 2017-04-14 NOTE — HHI.PR
Subjective


Remarks


This is a pleasant 71 y/o Female who was brought in for elective surgery by 

Doctor Konrad Jessica with Diagnosis


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 

Total Hip arthroplasty, as we know 


the patient has chronic Pain syndrome and narcotic dependence, Pain medicines 

handled by Orthopedic surgery


she has Obesity, Gout, Depression, Hypothyroidism, Hypertension, COPD, Emphysema

, Complex Regional Pain


syndrome on left knee and left arm, Multiple surgeries, status post Surgery and 

no Complaint, she already elected


a rehab facility. her friend Miss Aniyah Gonzalez with her at this time. she 

has chronic pain on low back area and


left knee managed with injections, until performed this procedure.





4/14: Seen in her bedroom in the presence of her friend Miss Aniyah Gonzalez

, no Nausea, vomit or diarrhea. 


         working with Physical therapy, do not complain of pain, Laboratory 

reviewed.





Objective





 Vital Signs








  Date Time  Temp Pulse Resp B/P Pulse Ox O2 Delivery O2 Flow Rate FiO2


 


4/14/17 07:55     97   21


 


4/14/17 07:46 98.1 80 18 163/93 93   


 


4/14/17 00:00 97.1 81 17 153/79 93   


 


4/13/17 20:25 98.4 73 18 130/59 95   


 


4/13/17 20:20 97.8 83 16 125/74 91   


 


4/13/17 18:58      Room Air  


 


4/13/17 16:20 98.5 83 17 156/84 92   


 


4/13/17 12:00 98.7 79 16 138/70 97   








 I/O








 4/13/17 4/13/17 4/13/17 4/14/17 4/14/17 4/14/17





 07:00 15:00 23:00 07:00 15:00 23:00


 


Intake Total 1045 ml 1080 ml 360 ml 240 ml  


 


Output Total 500 ml     


 


Balance 545 ml 1080 ml 360 ml 240 ml  


 


      


 


Intake Oral 360 ml 1080 ml 360 ml 240 ml  


 


IV Total 685 ml     


 


Output Urine Total 500 ml     


 


# Voids  8 1 2  


 


# Bowel Movements 0  0 0  








Result Diagram:  


4/14/17 0516                                                                   

             4/14/17 0516





Imaging





Last Impressions








Hip and Pelvis X-Ray 4/12/17 0000 Signed





Impressions: 





 Service Date/Time:  Wednesday, April 12, 2017 17:47 - CONCLUSION:  Expected 





 radiographic appearance after left bipolar hip arthroplasty. No acute 





 complication demonstrated.     Wesly Hall MD 


 


Hip X-Ray 4/12/17 0000 Signed





Impressions: 





 Service Date/Time:  Wednesday, April 12, 2017 15:53 - CONCLUSION:  

Postoperative 





 changes left total hip arthroplasty.     Mario Love MD 








Procedures


status post Left total hip arthroplasty by Doctor Konrad Jessica 4/13/17


Other Results





 Laboratory Tests








Test 4/12/17 4/14/17





 10:45 05:16


 


Blood Type O POSITIVE  


 


Antibody Screen NEGATIVE  


 


Blood Bank Comment   


 


White Blood Count  12.1 TH/MM3


 


Red Blood Count  4.39 MIL/MM3


 


Hemoglobin  12.5 GM/DL


 


Hematocrit  37.9 %


 


Mean Corpuscular Volume  86.3 FL


 


Mean Corpuscular Hemoglobin  28.5 PG


 


Mean Corpuscular Hemoglobin  33.1 %





Concent  


 


Red Cell Distribution Width  14.0 %


 


Platelet Count  170 TH/MM3


 


Mean Platelet Volume  9.7 FL


 


Sodium Level  143 MEQ/L


 


Potassium Level  4.0 MEQ/L


 


Chloride Level  105 MEQ/L


 


Carbon Dioxide Level  30.5 MEQ/L


 


Anion Gap  8 MEQ/L


 


Blood Urea Nitrogen  10 MG/DL


 


Creatinine  0.97 MG/DL


 


Estimat Glomerular Filtration  56 ML/MIN





Rate  


 


Random Glucose  114 MG/DL


 


Calcium Level  8.7 MG/DL


 


Magnesium Level  2.2 MG/DL


 


Triglycerides Level  90 MG/DL


 


Cholesterol Level  159 MG/DL


 


LDL Cholesterol  92 MG/DL


 


HDL Cholesterol  48.6 MG/DL


 


Cholesterol/HDL Ratio  3.27 RATIO


 


Free Thyroxine  1.06 NG/DL


 


Thyroid Stimulating Hormone  0.420 uIU/ML





3rd Gen  








Objective Remarks


GENERAL: Obesity, well-developed patient, in no apparent distress.


SKIN: No rashes, ecchymoses or lesions. Cool and dry.


HEAD: Atraumatic. Normocephalic. No temporal or scalp tenderness.


EYES: Pupils equal round and reactive. Extraocular motions intact. No scleral 

icterus. No injection or drainage. 


ENT: Nose without bleeding, purulent drainage or septal hematoma. Throat 

without erythema, tonsillar hypertrophy or exudate. Uvula midline. Airway 

patent.


NECK: Trachea midline. No JVD or lymphadenopathy. Supple, nontender, no 

meningeal signs.


CARDIOVASCULAR: Regular rate and rhythm without murmurs, gallops, or rubs. 


RESPIRATORY: Decreased breath sounds bilateral, no wheezing or crackles. 


GASTROINTESTINAL: Abdomen soft, non-tender, nondistended. No hepato-splenomegaly

, or palpable masses. No guarding.


MUSCULOSKELETAL: Extremities without clubbing, cyanosis, left hip dressed. 

multiple scars on left arm and left leg. 


NEUROLOGICAL: Awake and alert. no focal deficits.


Medications and IVs





 Current Medications








 Medications


  (Trade)  Dose


 Ordered  Sig/Elizabeth


 Route  Start Time


 Stop Time Status Last Admin


 


  (Betadine 7.5%


 Scrub)  1 applic  ONCE


 TOPICAL  4/12/17 10:00


 4/15/17 09:59   


 


 


  (Zyloprim)  100 mg  DAILY


 PO  4/13/17 09:00


    4/14/17 08:35


 


 


  (Symbicort


 160-4.5 Inh)  2 puff  DAILY


 INH  4/13/17 09:00


     


 


 


  (CeleXA)  10 mg  DAILY


 PO  4/13/17 09:00


    4/14/17 08:35


 


 


  (Colchicine)  0.6 mg  BID


 PO  4/12/17 21:00


    4/14/17 08:38


 


 


  (Synthroid)  25 mcg  DAILY@06


 PO  4/13/17 06:00


    4/14/17 06:00


 


 


  (Prinivil)  10 mg  HS


 PO  4/12/17 21:00


    4/13/17 21:16


 


 


  (Prinivil)  20 mg  DAILY


 PO  4/13/17 09:00


    4/14/17 08:35


 


 


  (Protonix)  20 mg  DAILY


 PO  4/13/17 09:00


    4/14/17 08:35


 


 


 Patient Own


 Medication  PT OWN


 MED:


 Hydrocod...  DAILY


 PO  4/13/17 09:00


   Hold  


 


 


  (NS Flush)  2 ml  UNSCH  PRN


 IVF  4/12/17 17:15


     


 


 


  (NS Flush)  2 ml  BID


 IVF  4/12/17 21:00


    4/14/17 09:00


 


 


  (Lovenox Inj)  40 mg  Q24H


 SQ  4/13/17 16:30


 4/22/17 16:31  4/13/17 16:35


 


 


  (Theragran M Tab)  1 tab  BID


 PO  4/13/17 21:00


 6/12/17 20:59   


 


 


  (Zofran Inj)  4 mg  Q6H  PRN


 IVP  4/12/17 17:15


    4/13/17 05:21


 


 


  (Colace)  100 mg  BID


 PO  4/13/17 21:00


    4/14/17 08:35


 


 


  (Mag-Al Plus


 Susp Liq)  30 ml  Q6H  PRN


 PO  4/12/17 17:15


     


 


 


  (Ambien)  5 mg  HS  PRN


 PO  4/12/17 17:15


     


 


 


  (Dulcolax Supp)  10 mg  DAILY  PRN


 RECTAL  4/12/17 17:15


     


 


 


  (Milk Of


 Magnesia Liq)  30 ml  DAILY  PRN


 PO  4/12/17 17:15


     


 


 


  (Narcan Inj)  0.4 mg  UNSCH  PRN


 IV  4/12/17 17:15


     


 


 


  (Benadryl Inj)  25 mg  Q6H  PRN


 IV  4/12/17 17:15


    4/14/17 14:21


 


 


  (Dilaudid)  2 mg  Q6H  PRN


 PO  4/12/17 17:30


     


 


 


  (Dilaudid)  4 mg  Q4H  PRN


 PO  4/12/17 17:30


    4/14/17 14:21


 


 


  (Dilaudid Pf Inj)  1 mg  Q3H  PRN


 IV  4/13/17 14:15


     


 











A/P


Assessment and Plan


1. 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 

total hip arthroplasty by Doctor Konrad Jessica 


    04/13/17 improving post op 


2. Chronic Pain syndrome and narcotic dependence to continue her Home medicines


3. Obesity strongly recommended diet and exercise as outpatient


4. COPD Emphysema to continue Bronchodilator, Mucolytic and Incentive 

spirometry. Non exacerbated. 


5. Gout to continue home medicines


6. Hypothyroidism continue Hormonal replacement


7. Hypertension Mild uncontrol she is eating and drinking well removed IV 

fluids. 


8. Complex Regional Pain syndrome on left knee and left arm, Multiple surgeries.





DVT prophylaxis with Lovenox





Code Status


Full Code.


Discussed Condition With


Patient and her Friend in the room Miss Aniyah Karimimarylinak all questions 

answered to the best of 


my abilities.


Discharge Planning


Expected by tomorrow








Moe Crockett MD Apr 14, 2017 11:08

## 2017-04-15 VITALS
OXYGEN SATURATION: 92 % | DIASTOLIC BLOOD PRESSURE: 87 MMHG | SYSTOLIC BLOOD PRESSURE: 151 MMHG | TEMPERATURE: 98 F | RESPIRATION RATE: 18 BRPM | HEART RATE: 86 BPM

## 2017-04-15 VITALS
SYSTOLIC BLOOD PRESSURE: 171 MMHG | OXYGEN SATURATION: 93 % | HEART RATE: 88 BPM | DIASTOLIC BLOOD PRESSURE: 89 MMHG | RESPIRATION RATE: 18 BRPM | TEMPERATURE: 98 F

## 2017-04-15 VITALS
SYSTOLIC BLOOD PRESSURE: 160 MMHG | HEART RATE: 88 BPM | OXYGEN SATURATION: 93 % | TEMPERATURE: 98.5 F | DIASTOLIC BLOOD PRESSURE: 85 MMHG | RESPIRATION RATE: 18 BRPM

## 2017-04-15 VITALS
OXYGEN SATURATION: 91 % | SYSTOLIC BLOOD PRESSURE: 182 MMHG | RESPIRATION RATE: 17 BRPM | DIASTOLIC BLOOD PRESSURE: 89 MMHG | TEMPERATURE: 99.5 F | HEART RATE: 94 BPM

## 2017-04-15 LAB
ERYTHROCYTE [DISTWIDTH] IN BLOOD BY AUTOMATED COUNT: 14.4 % (ref 11.6–17.2)
HCT VFR BLD CALC: 37.9 % (ref 35–46)
MCH RBC QN AUTO: 29.2 PG (ref 27–34)
MCHC RBC AUTO-ENTMCNC: 33.7 % (ref 32–36)
MCV RBC AUTO: 86.7 FL (ref 80–100)
PLATELET # BLD: 193 TH/MM3 (ref 150–450)
RBC # BLD AUTO: 4.37 MIL/MM3 (ref 4–5.3)
REVIEW FLAG: (no result)
WBC # BLD AUTO: 10.2 TH/MM3 (ref 4–11)

## 2017-04-15 RX ADMIN — LISINOPRIL SCH MG: 20 TABLET ORAL at 09:42

## 2017-04-15 RX ADMIN — CITALOPRAM SCH MG: 20 TABLET, FILM COATED ORAL at 09:40

## 2017-04-15 RX ADMIN — MULTIPLE VITAMINS W/ MINERALS TAB SCH TAB: TAB at 20:44

## 2017-04-15 RX ADMIN — LISINOPRIL SCH MG: 10 TABLET ORAL at 20:44

## 2017-04-15 RX ADMIN — POLYETHYLENE GLYCOL 3350 SCH GM: 17 POWDER, FOR SOLUTION ORAL at 09:00

## 2017-04-15 RX ADMIN — SODIUM CHLORIDE, PRESERVATIVE FREE SCH ML: 5 INJECTION INTRAVENOUS at 19:30

## 2017-04-15 RX ADMIN — LEVOTHYROXINE SODIUM SCH MCG: 25 TABLET ORAL at 06:56

## 2017-04-15 RX ADMIN — PANTOPRAZOLE SODIUM SCH MG: 20 TABLET, DELAYED RELEASE ORAL at 09:42

## 2017-04-15 RX ADMIN — DOCUSATE SODIUM SCH MG: 100 CAPSULE, LIQUID FILLED ORAL at 09:43

## 2017-04-15 RX ADMIN — BUDESONIDE AND FORMOTEROL FUMARATE DIHYDRATE SCH PUFF: 160; 4.5 AEROSOL RESPIRATORY (INHALATION) at 09:00

## 2017-04-15 RX ADMIN — SODIUM CHLORIDE, PRESERVATIVE FREE SCH ML: 5 INJECTION INTRAVENOUS at 09:00

## 2017-04-15 RX ADMIN — ALLOPURINOL SCH MG: 100 TABLET ORAL at 09:41

## 2017-04-15 RX ADMIN — DOCUSATE SODIUM SCH MG: 100 CAPSULE, LIQUID FILLED ORAL at 20:44

## 2017-04-15 RX ADMIN — COLCHICINE SCH MG: 0.6 TABLET, FILM COATED ORAL at 20:44

## 2017-04-15 RX ADMIN — MULTIPLE VITAMINS W/ MINERALS TAB SCH TAB: TAB at 09:00

## 2017-04-15 RX ADMIN — COLCHICINE SCH MG: 0.6 TABLET, FILM COATED ORAL at 09:00

## 2017-04-15 RX ADMIN — ENOXAPARIN SODIUM SCH MG: 40 INJECTION SUBCUTANEOUS at 17:41

## 2017-04-15 NOTE — HHI.PR
Subjective


Remarks


This is a pleasant 71 y/o Female who was brought in for elective surgery by 

Doctor Konrad Jessica with Diagnosis


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 

Total Hip arthroplasty, as we know 


the patient has chronic Pain syndrome and narcotic dependence, Pain medicines 

handled by Orthopedic surgery


she has Obesity, Gout, Depression, Hypothyroidism, Hypertension, COPD, Emphysema

, Complex Regional Pain


syndrome on left knee and left arm, Multiple surgeries, status post Surgery and 

no Complaint, she already elected


a rehab facility. her friend Miss Aniyah Oneilak with her at this time. she 

has chronic pain on low back area and


left knee managed with injections, until performed this procedure.





4/15: Seen in her bedroom in the presence of her Nurse Miss Goodson and 

Physical Therapy specialist, working fine


        with PT, has no Nausea, vomit or diarrhea but she has Constipation, 

will add Lactulose will go today to Home 


        with [facility]





Objective





 Vital Signs








  Date Time  Temp Pulse Resp B/P Pulse Ox O2 Delivery O2 Flow Rate FiO2


 


4/14/17 23:30 98.3 82 17 149/79 92   


 


4/14/17 20:10 98.4 82 17 150/83 94   


 


4/14/17 19:06      Room Air  


 


4/14/17 16:02 98.9 70 16 134/79 94   


 


4/14/17 12:12 99.6 76 16 146/73 93   








 I/O








 4/14/17 4/14/17 4/14/17 4/15/17 4/15/17 4/15/17





 07:00 15:00 23:00 07:00 15:00 23:00


 


Intake Total 240 ml  480 ml 480 ml  


 


Balance 240 ml  480 ml 480 ml  


 


      


 


Intake Oral 240 ml  480 ml 480 ml  


 


# Voids 2  1 4  


 


# Bowel Movements 0  0 0  








Result Diagram:  


4/15/17 0650                                                                   

             4/14/17 0516





Imaging





Last Impressions








Hip and Pelvis X-Ray 4/12/17 0000 Signed





Impressions: 





 Service Date/Time:  Wednesday, April 12, 2017 17:47 - CONCLUSION:  Expected 





 radiographic appearance after left bipolar hip arthroplasty. No acute 





 complication demonstrated.     Wesly Hall MD 


 


Hip X-Ray 4/12/17 0000 Signed





Impressions: 





 Service Date/Time:  Wednesday, April 12, 2017 15:53 - CONCLUSION:  

Postoperative 





 changes left total hip arthroplasty.     Mario Love MD 








Procedures


status post Left total hip arthroplasty by Doctor Konrad Jessica 4/13/17


Other Results





 Laboratory Tests








Test 4/12/17 4/14/17 4/15/17





 10:45 05:16 06:50


 


Blood Type O POSITIVE   


 


Antibody Screen NEGATIVE   


 


Blood Bank Comment    


 


Sodium Level  143 MEQ/L 


 


Potassium Level  4.0 MEQ/L 


 


Chloride Level  105 MEQ/L 


 


Carbon Dioxide Level  30.5 MEQ/L 


 


Anion Gap  8 MEQ/L 


 


Blood Urea Nitrogen  10 MG/DL 


 


Creatinine  0.97 MG/DL 


 


Estimat Glomerular Filtration  56 ML/MIN 





Rate   


 


Random Glucose  114 MG/DL 


 


Hemoglobin A1c  5.2 % 


 


Calcium Level  8.7 MG/DL 


 


Magnesium Level  2.2 MG/DL 


 


Triglycerides Level  90 MG/DL 


 


Cholesterol Level  159 MG/DL 


 


LDL Cholesterol  92 MG/DL 


 


HDL Cholesterol  48.6 MG/DL 


 


Cholesterol/HDL Ratio  3.27 RATIO 


 


Free Thyroxine  1.06 NG/DL 


 


Thyroid Stimulating Hormone  0.420 uIU/ML 





3rd Gen   


 


White Blood Count   10.2 TH/MM3


 


Red Blood Count   4.37 MIL/MM3


 


Hemoglobin   12.7 GM/DL


 


Hematocrit   37.9 %


 


Mean Corpuscular Volume   86.7 FL


 


Mean Corpuscular Hemoglobin   29.2 PG


 


Mean Corpuscular Hemoglobin   33.7 %





Concent   


 


Red Cell Distribution Width   14.4 %


 


Platelet Count   193 TH/MM3


 


Mean Platelet Volume   9.7 FL








Objective Remarks


GENERAL: Obesity, well-developed patient, in no apparent distress.


SKIN: No rashes, ecchymoses or lesions. Cool and dry.


HEAD: Atraumatic. Normocephalic. No temporal or scalp tenderness.


EYES: Pupils equal round and reactive. Extraocular motions intact. No scleral 

icterus. No injection or drainage. 


ENT: Nose without bleeding, purulent drainage or septal hematoma. Throat 

without erythema, tonsillar hypertrophy or exudate. Uvula midline. Airway 

patent.


NECK: Trachea midline. No JVD or lymphadenopathy. Supple, nontender, no 

meningeal signs.


CARDIOVASCULAR: Regular rate and rhythm without murmurs, gallops, or rubs. 


RESPIRATORY: Decreased breath sounds bilateral, no wheezing or crackles. 


GASTROINTESTINAL: Abdomen soft, non-tender, nondistended. No hepato-splenomegaly

, or palpable masses. No guarding.


MUSCULOSKELETAL: Extremities without clubbing, cyanosis, left hip dressed. 

multiple scars on left arm and left leg. 


NEUROLOGICAL: Awake and alert. no focal deficits.


Medications and IVs





 Current Medications








 Medications


  (Trade)  Dose


 Ordered  Sig/Elizabeth


 Route  Start Time


 Stop Time Status Last Admin


 


  (Betadine 7.5%


 Scrub)  1 applic  ONCE


 TOPICAL  4/12/17 10:00


 4/15/17 09:59   


 


 


  (Zyloprim)  100 mg  DAILY


 PO  4/13/17 09:00


    4/15/17 09:41


 


 


  (Symbicort


 160-4.5 Inh)  2 puff  DAILY


 INH  4/13/17 09:00


     


 


 


  (CeleXA)  10 mg  DAILY


 PO  4/13/17 09:00


    4/15/17 09:40


 


 


  (Colchicine)  0.6 mg  BID


 PO  4/12/17 21:00


    4/15/17 09:00


 


 


  (Synthroid)  25 mcg  DAILY@06


 PO  4/13/17 06:00


    4/15/17 06:56


 


 


  (Prinivil)  10 mg  HS


 PO  4/12/17 21:00


    4/14/17 22:11


 


 


  (Prinivil)  20 mg  DAILY


 PO  4/13/17 09:00


    4/15/17 09:42


 


 


  (Protonix)  20 mg  DAILY


 PO  4/13/17 09:00


    4/15/17 09:42


 


 


 Patient Own


 Medication  PT OWN


 MED:


 Hydrocod...  DAILY


 PO  4/13/17 09:00


   Hold  


 


 


  (NS Flush)  2 ml  UNSCH  PRN


 IVF  4/12/17 17:15


     


 


 


  (NS Flush)  2 ml  BID


 IVF  4/12/17 21:00


    4/15/17 09:00


 


 


  (Lovenox Inj)  40 mg  Q24H


 SQ  4/13/17 16:30


 4/22/17 16:31  4/14/17 16:33


 


 


  (Theragran M Tab)  1 tab  BID


 PO  4/13/17 21:00


 6/12/17 20:59   


 


 


  (Zofran Inj)  4 mg  Q6H  PRN


 IVP  4/12/17 17:15


    4/13/17 05:21


 


 


  (Colace)  100 mg  BID


 PO  4/13/17 21:00


    4/15/17 09:43


 


 


  (Mag-Al Plus


 Susp Liq)  30 ml  Q6H  PRN


 PO  4/12/17 17:15


     


 


 


  (Ambien)  5 mg  HS  PRN


 PO  4/12/17 17:15


     


 


 


  (Dulcolax Supp)  10 mg  DAILY  PRN


 RECTAL  4/12/17 17:15


    4/15/17 09:45


 


 


  (Milk Of


 Magnesia Liq)  30 ml  DAILY  PRN


 PO  4/12/17 17:15


     


 


 


  (Narcan Inj)  0.4 mg  UNSCH  PRN


 IV  4/12/17 17:15


     


 


 


  (Benadryl Inj)  25 mg  Q6H  PRN


 IV  4/12/17 17:15


    4/15/17 06:57


 


 


  (Dilaudid)  2 mg  Q6H  PRN


 PO  4/12/17 17:30


     


 


 


  (Dilaudid)  4 mg  Q4H  PRN


 PO  4/12/17 17:30


    4/15/17 06:57


 


 


  (Dilaudid Pf Inj)  1 mg  Q3H  PRN


 IV  4/13/17 14:15


     


 


 


  (Miralax)  17 gm  DAILY


 PO  4/15/17 09:00


     


 











A/P


Assessment and Plan


1. 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 

total hip arthroplasty by Doctor Konrad Jessica 


    04/13/17 improving post op 


2. Chronic Pain syndrome and narcotic dependence to continue her Home medicines


3. Obesity strongly recommended diet and exercise as outpatient


4. COPD Emphysema to continue Bronchodilator, Mucolytic and Incentive 

spirometry. Non exacerbated. 


5. Gout to continue home medicines


6. Hypothyroidism continue Hormonal replacement


7. Hypertension Mild uncontrol she is eating and drinking well removed IV 

fluids. 


8. Complex Regional Pain syndrome on left knee and left arm, Multiple surgeries.


9. Constipation added Lactulose





DVT prophylaxis with Lovenox





Code Status


Full Code.


Discussed Condition With


Patient, Nurse Miss Goodson and Physical Therapy specialist,  all questions 

answered to the best of 


my abilities.


Discharge Planning


Expected today after she has her BM.


Okay from Medicine standpoint to go home today








Moe Crockett MD Apr 15, 2017 09:49

## 2017-04-15 NOTE — RADRPT
EXAM DATE/TIME:  04/15/2017 21:59 

 

[facility] COMPARISON:     

No previous studies available for comparison.

 

 

INDICATIONS :     

Obstruction.

                      

 

IV CONTRAST:     

95 cc Omnipaque 350 (iohexol) IV 

 

 

ORAL CONTRAST:      

Prescribed oral contrast ingested.

                      

 

RADIATION DOSE:     

19.91 CTDIvol (mGy) 

 

 

MEDICAL HISTORY :     

Cardiovascular disease. Chronic obstructive pulmonary disease. Renal cyst. Liver cyst.

 

SURGICAL HISTORY :      

Appendectomy. Left hip replacement.

 

ENCOUNTER:      

Initial

 

ACUITY:      

1 day

 

PAIN SCALE:      

6/10

 

LOCATION:       

Bilateral  abdomen

 

TECHNIQUE:     

Volumetric scanning of the abdomen and pelvis was performed.  Using automated exposure control and ad
justment of the mA and/or kV according to patient size, radiation dose was kept as low as reasonably 
achievable to obtain optimal diagnostic quality images. 

 

FINDINGS:     

There are postoperative changes of left total hip replacement. There is still some fluid soft tissues
. Skin staples present laterally.

 

There is atelectasis at the right lung base with elevated right hemidiaphragm. Left lung base is charlie
r except for minimal scarring.

 

No acute findings in the liver, spleen, adrenals, kidneys or pancreas. Right renal cysts present. No 
calcified gallstones or biliary ductal dilatation.

 

No pelvic masses. Trace air within the bladder. No bowel obstruction or adenopathy.

 

CONCLUSION:     

1. Postoperative left hip replacement recently with edematous change and locules of air within the so
ft tissues and skin staples laterally.

2.  No acute findings within the abdomen and pelvis. Specifically no bowel obstruction, free air or f
ree fluid.

 

 

 

 Wilfredo So MD on April 15, 2017 at 22:27           

Board Certified Radiologist.

 This report was verified electronically.

## 2017-04-16 VITALS
DIASTOLIC BLOOD PRESSURE: 87 MMHG | OXYGEN SATURATION: 99 % | TEMPERATURE: 96.9 F | SYSTOLIC BLOOD PRESSURE: 178 MMHG | RESPIRATION RATE: 16 BRPM | HEART RATE: 80 BPM

## 2017-04-16 VITALS
RESPIRATION RATE: 18 BRPM | OXYGEN SATURATION: 95 % | DIASTOLIC BLOOD PRESSURE: 79 MMHG | HEART RATE: 94 BPM | TEMPERATURE: 98 F | SYSTOLIC BLOOD PRESSURE: 164 MMHG

## 2017-04-16 VITALS
HEART RATE: 80 BPM | RESPIRATION RATE: 18 BRPM | DIASTOLIC BLOOD PRESSURE: 78 MMHG | OXYGEN SATURATION: 92 % | TEMPERATURE: 98 F | SYSTOLIC BLOOD PRESSURE: 160 MMHG

## 2017-04-16 RX ADMIN — LISINOPRIL SCH MG: 20 TABLET ORAL at 09:41

## 2017-04-16 RX ADMIN — COLCHICINE SCH MG: 0.6 TABLET, FILM COATED ORAL at 09:41

## 2017-04-16 RX ADMIN — POLYETHYLENE GLYCOL 3350 SCH GM: 17 POWDER, FOR SOLUTION ORAL at 09:42

## 2017-04-16 RX ADMIN — SODIUM CHLORIDE, PRESERVATIVE FREE SCH ML: 5 INJECTION INTRAVENOUS at 09:00

## 2017-04-16 RX ADMIN — ENOXAPARIN SODIUM SCH MG: 40 INJECTION SUBCUTANEOUS at 15:56

## 2017-04-16 RX ADMIN — ALLOPURINOL SCH MG: 100 TABLET ORAL at 09:41

## 2017-04-16 RX ADMIN — MULTIPLE VITAMINS W/ MINERALS TAB SCH TAB: TAB at 09:00

## 2017-04-16 RX ADMIN — DOCUSATE SODIUM SCH MG: 100 CAPSULE, LIQUID FILLED ORAL at 09:40

## 2017-04-16 RX ADMIN — BUDESONIDE AND FORMOTEROL FUMARATE DIHYDRATE SCH PUFF: 160; 4.5 AEROSOL RESPIRATORY (INHALATION) at 09:00

## 2017-04-16 RX ADMIN — PANTOPRAZOLE SODIUM SCH MG: 20 TABLET, DELAYED RELEASE ORAL at 09:41

## 2017-04-16 RX ADMIN — CITALOPRAM SCH MG: 20 TABLET, FILM COATED ORAL at 09:43

## 2017-04-16 RX ADMIN — LEVOTHYROXINE SODIUM SCH MCG: 25 TABLET ORAL at 06:22

## 2017-04-16 NOTE — PD.ORT.PN
Subjective


Post Op Day #:  4


Subjective Remarks


Patient laying comfortably in bed. CT of abdomen performed last night to rule 

out ileus - results negative. She denies having a bowel movement. Admits to 

nausea and sweats. Admits left hip pain controlled. Admits to walking 

successfully last night. .





Objective


Vitals





 Vital Signs








  Date Time  Temp Pulse Resp B/P Pulse Ox O2 Delivery O2 Flow Rate FiO2


 


4/16/17 00:25 98.0 94 18 164/79 95   


 


4/15/17 20:15 99.5 94 17 182/89 91   


 


4/15/17 16:00 98.5 88 18 160/85 93   


 


4/15/17 12:00 98.0 88 18 171/89 93   


 


4/15/17 08:00 98.0 86 18 151/87 92   








 I/O








 4/15/17 4/15/17 4/15/17 4/16/17 4/16/17 4/16/17





 07:00 15:00 23:00 07:00 15:00 23:00


 


Intake Total 480 ml 600 ml 480 ml   


 


Balance 480 ml 600 ml 480 ml   


 


      


 


Intake Oral 480 ml 600 ml 480 ml   


 


# Voids 4 4 3   


 


# Bowel Movements 0 0 0   








Result Diagram:  


4/15/17 0650                                                                   

             4/14/17 0516





Imaging





Last Impressions








Abdomen/Pelvis CT 4/15/17 0000 Signed





Impressions: 





 Service Date/Time:  Saturday, April 15, 2017 21:59 - CONCLUSION:  1. 





 Postoperative left hip replacement recently with edematous change and locules 

of 





 air within the soft tissues and skin staples laterally. 2.  No acute findings 





 within the abdomen and pelvis. Specifically no bowel obstruction, free air or 





 free fluid.     Wilfredo So MD 


 


Hip and Pelvis X-Ray 4/12/17 0000 Signed





Impressions: 





 Service Date/Time:  Wednesday, April 12, 2017 17:47 - CONCLUSION:  Expected 





 radiographic appearance after left bipolar hip arthroplasty. No acute 





 complication demonstrated.     Wesly Hall MD 


 


Hip X-Ray 4/12/17 0000 Signed





Impressions: 





 Service Date/Time:  Wednesday, April 12, 2017 15:53 - CONCLUSION:  

Postoperative 





 changes left total hip arthroplasty.     Mario Love MD 








Procedures


Left EUSEBIO


Objective Remarks


The patient's dressing is C/D/I.  Calf is soft and nontender.  EHL/TA/G intact.

  2+ pedal pulse.  Minimal swelling. Mild area of erythema noted anteriorly. No 

warmth. + SILT.





Assessment & Plan


Ortho Post Op Day #:  4


Problem List:  


(1) Status post total hip replacement, left


Assessment and Plan


POD #4:  Left EUSEBIO





1.  WBAT LLE


2.  Lovenox for DVT prophylaxis


3.  Ice to the left hip PRN


4.  Ready for discharge to rehab center from an orthopedic standpoint. Awaiting 

medical's recommendation for discharge.








Rebecca Beck Apr 16, 2017 07:37

## 2017-04-16 NOTE — HHI.PR
Subjective


Remarks


This is a pleasant 73 y/o Female who was brought in for elective surgery by 

Doctor Konrad Jessica with Diagnosis


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 

Total Hip arthroplasty, as we know 


the patient has chronic Pain syndrome and narcotic dependence, Pain medicines 

handled by Orthopedic surgery


she has Obesity, Gout, Depression, Hypothyroidism, Hypertension, COPD, Emphysema

, Complex Regional Pain


syndrome on left knee and left arm, Multiple surgeries, status post Surgery and 

no Complaint, she already elected


a rehab facility. her friend Miss Aniyah Oneilak with her at this time. she 

has chronic pain on low back area and


left knee managed with injections, until performed this procedure.





4/15: Seen in her bedroom in the presence of her Nurse Miss Pricea and 

Physical Therapy specialist, working fine


        with PT, has no Nausea, vomit or diarrhea but she has Constipation, 

will add Lactulose will go today to Home 


        with [facility] 


4/16: patient without complaint, did not have Bowel movement yet received 

multiple medicine without difficulty 


        awaiting for BM to discharge.





Objective





 Vital Signs








  Date Time  Temp Pulse Resp B/P Pulse Ox O2 Delivery O2 Flow Rate FiO2


 


4/16/17 00:25 98.0 94 18 164/79 95   


 


4/15/17 20:15 99.5 94 17 182/89 91   


 


4/15/17 16:00 98.5 88 18 160/85 93   


 


4/15/17 12:00 98.0 88 18 171/89 93   








 I/O








 4/15/17 4/15/17 4/15/17 4/16/17 4/16/17 4/16/17





 07:00 15:00 23:00 07:00 15:00 23:00


 


Intake Total 480 ml 600 ml 480 ml 480 ml  


 


Balance 480 ml 600 ml 480 ml 480 ml  


 


      


 


Intake Oral 480 ml 600 ml 480 ml 480 ml  


 


# Voids 4 4 3 4  


 


# Bowel Movements 0 0 0 0  








Result Diagram:  


4/15/17 0650                                                                   

             4/14/17 0516





Imaging





Last Impressions








Abdomen/Pelvis CT 4/15/17 0000 Signed





Impressions: 





 Service Date/Time:  Saturday, April 15, 2017 21:59 - CONCLUSION:  1. 





 Postoperative left hip replacement recently with edematous change and locules 

of 





 air within the soft tissues and skin staples laterally. 2.  No acute findings 





 within the abdomen and pelvis. Specifically no bowel obstruction, free air or 





 free fluid.     Wilfredo So MD 


 


Hip and Pelvis X-Ray 4/12/17 0000 Signed





Impressions: 





 Service Date/Time:  Wednesday, April 12, 2017 17:47 - CONCLUSION:  Expected 





 radiographic appearance after left bipolar hip arthroplasty. No acute 





 complication demonstrated.     Wesly Hall MD 


 


Hip X-Ray 4/12/17 0000 Signed





Impressions: 





 Service Date/Time:  Wednesday, April 12, 2017 15:53 - CONCLUSION:  

Postoperative 





 changes left total hip arthroplasty.     Mario Love MD 








Procedures


status post Left total hip arthroplasty by Doctor Konrad Jessica 4/13/17


Other Results





 Laboratory Tests








Test 4/12/17 4/14/17 4/15/17





 10:45 05:16 06:50


 


Blood Type O POSITIVE   


 


Antibody Screen NEGATIVE   


 


Blood Bank Comment    


 


Sodium Level  143 MEQ/L 


 


Potassium Level  4.0 MEQ/L 


 


Chloride Level  105 MEQ/L 


 


Carbon Dioxide Level  30.5 MEQ/L 


 


Anion Gap  8 MEQ/L 


 


Blood Urea Nitrogen  10 MG/DL 


 


Creatinine  0.97 MG/DL 


 


Estimat Glomerular Filtration  56 ML/MIN 





Rate   


 


Random Glucose  114 MG/DL 


 


Hemoglobin A1c  5.2 % 


 


Calcium Level  8.7 MG/DL 


 


Magnesium Level  2.2 MG/DL 


 


Triglycerides Level  90 MG/DL 


 


Cholesterol Level  159 MG/DL 


 


LDL Cholesterol  92 MG/DL 


 


HDL Cholesterol  48.6 MG/DL 


 


Cholesterol/HDL Ratio  3.27 RATIO 


 


Free Thyroxine  1.06 NG/DL 


 


Thyroid Stimulating Hormone  0.420 uIU/ML 





3rd Gen   


 


White Blood Count   10.2 TH/MM3


 


Red Blood Count   4.37 MIL/MM3


 


Hemoglobin   12.7 GM/DL


 


Hematocrit   37.9 %


 


Mean Corpuscular Volume   86.7 FL


 


Mean Corpuscular Hemoglobin   29.2 PG


 


Mean Corpuscular Hemoglobin   33.7 %





Concent   


 


Red Cell Distribution Width   14.4 %


 


Platelet Count   193 TH/MM3


 


Mean Platelet Volume   9.7 FL








Objective Remarks


GENERAL: Obesity, well-developed patient, in no apparent distress.


SKIN: No rashes, ecchymoses or lesions. Cool and dry.


HEAD: Atraumatic. Normocephalic. No temporal or scalp tenderness.


EYES: Pupils equal round and reactive. Extraocular motions intact. No scleral 

icterus. No injection or drainage. 


ENT: Nose without bleeding, purulent drainage or septal hematoma. Throat 

without erythema, tonsillar hypertrophy or exudate. Uvula midline. Airway 

patent.


NECK: Trachea midline. No JVD or lymphadenopathy. Supple, nontender, no 

meningeal signs.


CARDIOVASCULAR: Regular rate and rhythm without murmurs, gallops, or rubs. 


RESPIRATORY: Decreased breath sounds bilateral, no wheezing or crackles. 


GASTROINTESTINAL: Abdomen soft, non-tender, nondistended. No hepato-splenomegaly

, or palpable masses. No guarding.


MUSCULOSKELETAL: Extremities without clubbing, cyanosis, left hip dressed. 

multiple scars on left arm and left leg. 


NEUROLOGICAL: Awake and alert. no focal deficits.


Medications and IVs





 Current Medications








 Medications


  (Trade)  Dose


 Ordered  Sig/Elizabeth


 Route  Start Time


 Stop Time Status Last Admin


 


  (Zyloprim)  100 mg  DAILY


 PO  4/13/17 09:00


    4/15/17 09:41


 


 


  (Symbicort


 160-4.5 Inh)  2 puff  DAILY


 INH  4/13/17 09:00


     


 


 


  (CeleXA)  10 mg  DAILY


 PO  4/13/17 09:00


    4/15/17 09:40


 


 


  (Colchicine)  0.6 mg  BID


 PO  4/12/17 21:00


    4/15/17 20:44


 


 


  (Synthroid)  25 mcg  DAILY@06


 PO  4/13/17 06:00


    4/16/17 06:22


 


 


  (Prinivil)  10 mg  HS


 PO  4/12/17 21:00


    4/15/17 20:44


 


 


  (Prinivil)  20 mg  DAILY


 PO  4/13/17 09:00


    4/15/17 09:42


 


 


  (Protonix)  20 mg  DAILY


 PO  4/13/17 09:00


    4/15/17 09:42


 


 


 Patient Own


 Medication  PT OWN


 MED:


 Hydrocod...  DAILY


 PO  4/13/17 09:00


   Hold  


 


 


  (NS Flush)  2 ml  UNSCH  PRN


 IVF  4/12/17 17:15


     


 


 


  (NS Flush)  2 ml  BID


 IVF  4/12/17 21:00


    4/15/17 19:30


 


 


  (Lovenox Inj)  40 mg  Q24H


 SQ  4/13/17 16:30


 4/22/17 16:31  4/15/17 17:41


 


 


  (Theragran M Tab)  1 tab  BID


 PO  4/13/17 21:00


 6/12/17 20:59   


 


 


  (Zofran Inj)  4 mg  Q6H  PRN


 IVP  4/12/17 17:15


    4/13/17 05:21


 


 


  (Colace)  100 mg  BID


 PO  4/13/17 21:00


    4/15/17 20:44


 


 


  (Mag-Al Plus


 Susp Liq)  30 ml  Q6H  PRN


 PO  4/12/17 17:15


     


 


 


  (Ambien)  5 mg  HS  PRN


 PO  4/12/17 17:15


     


 


 


  (Dulcolax Supp)  10 mg  DAILY  PRN


 RECTAL  4/12/17 17:15


    4/15/17 09:45


 


 


  (Milk Of


 Magnesia Liq)  30 ml  DAILY  PRN


 PO  4/12/17 17:15


     


 


 


  (Narcan Inj)  0.4 mg  UNSCH  PRN


 IV  4/12/17 17:15


     


 


 


  (Benadryl Inj)  25 mg  Q6H  PRN


 IV  4/12/17 17:15


    4/15/17 06:57


 


 


  (Dilaudid)  2 mg  Q6H  PRN


 PO  4/12/17 17:30


     


 


 


  (Dilaudid)  4 mg  Q4H  PRN


 PO  4/12/17 17:30


    4/16/17 03:35


 


 


  (Dilaudid Pf Inj)  1 mg  Q3H  PRN


 IV  4/13/17 14:15


     


 


 


  (Miralax)  17 gm  DAILY


 PO  4/15/17 09:00


     


 











A/P


Assessment and Plan


1. 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 

total hip arthroplasty by Doctor Konrad Jessica 


    04/13/17 improving post op 


2. Chronic Pain syndrome and narcotic dependence to continue her Home medicines


3. Obesity strongly recommended diet and exercise as outpatient


4. COPD Emphysema to continue Bronchodilator, Mucolytic and Incentive 

spirometry. Non exacerbated. 


5. Gout to continue home medicines


6. Hypothyroidism continue Hormonal replacement


7. Hypertension Mild uncontrol she is eating and drinking well removed IV 

fluids. 


8. Complex Regional Pain syndrome on left knee and left arm, Multiple surgeries.


9. Constipation once have a BM may be discharge from Medicine standpoint. 





DVT prophylaxis with Lovenox





Code Status


Full Code.


Discussed Condition With


Patient, Nurse Miss Goodson


Discharge Planning


Okay to discharge to SNF once she has a BM








Moe Crockett MD Apr 16, 2017 08:59


Moe Crockett MD Apr 16, 2017 08:59

## 2017-04-16 NOTE — HHI.DS
Discharge Summary


Admission Date


Apr 12, 2017 at 09:22


Discharge Date:  Apr 16, 2017


Admitting Diagnosis


Stress fracture of left hip


Status post total hip replacement, left


Diagnosis:  


(1) Status post total hip replacement, left


Diagnosis:  Principal





(2) Stress fracture of left hip


Diagnosis:  Principal





Procedures


Left EUSEBIO


Brief History


This is a 72 year old female patient with a stress fracture of the left hip.


CBC/BMP:  


4/15/17 0650                                                                   

             4/14/17 0516





Significant Findings





Laboratory Tests








Test 4/14/17





 05:16


 


White Blood Count 12.1 TH/MM3





 (4.0-11.0)


 


Estimat Glomerular Filtration 56 ML/MIN (>89)





Rate 


 


Random Glucose 114 MG/DL





 ()








PE at Discharge


The patient's dressing is C/D/I.  Calf is soft and nontender.  EHL/TA/G intact.

  2+ pedal pulse.  Minimal swelling. Mild area of erythema noted anteriorly. No 

warmth. + SILT.


Hospital Course


The patient was admitted to the hospital for a stress fracture of the left hip 

for a left EUSEBIO.  The patient's surgery went well with no complications.  The 

patient is WBAT on the left LE.  The patient was placed on a regular diet.  The 

patient was kept an additional night to help r/o Ileus of the abdomen which was 

negative.                                                                      

                                                                    The patient 

was discharged to a SNF and will f/u with Dr. Jessica in 2 weeks.


Pt Condition on Discharge:  Stable


Discharge Disposition:  Disch w/ Home Health Serv


Discharge Instructions


Diet Instructions:  As Tolerated, No Restrictions


Activities You Can Perform:  Weight Bearing as Juan


Activities to Avoid:  Strenuous Activity


Follow up Referrals:  


Orthopedics with Konrad Jessica MD





New Medications:  


Aspirin (Aspirin) 325 Mg Tab


325 MG PO DAILY Start Aspirin after Lovenox is completed. Prevent Blood Clot #

30 Ref 0 TAB


Commode 3-in-1 (Commode 3-in-1) 1 Mis Mis


1 EA .ROUTE AS DIRECTED #1 Ref 0 EA


Enoxaparin Inj (Lovenox Inj) 40 Mg/0.4 Ml Syr


40 MG SQ DAILY Start Aspirin after Lovenox is completed. Blood Clot Prevention #

10 Ref 0 SYRINGE


Hydromorphone (Hydromorphone) 2 Mg Tab


1-2 TAB PO Q4H PRN PAIN #60 Ref 0 TAB


Walker with Front Wheels (Walker with Front Wheels) 1 Mis Mis


1 EA .ROUTE AS DIRECTED #1 Ref 0 EA


 


Continued Medications:  


Allopurinol (Allopurinol) 100 Mg Tab


100 MG PO DAILY Gout #30 Ref 0 TAB


Budesonide-Formoterol Inh (Symbicort Inh) 160-4.5 Mcg/Act Aero


2 PUFF INH DAILY #1 Ref 0 INHALER


Calcium Carbonate-Vitamin D (Calcium 500 + D) 500-125 Mg-Unit Tab


1 TAB PO DAILY TAB


Cholecalciferol (Vitamin D) 400 Unit/Ml Drops


400 UNITS PO DAILY BOTTLE


Citalopram (Citalopram) 10 Mg Tab


10 MG PO DAILY Control Depression #30 Ref 0 TAB


Esomeprazole DR (Nexium) 20 Mg Capdr


20 MG PO DAILY Ref 0 CAP


Levothyroxine (Levothyroxine) 25 Mcg Tab


25 MCG PO DAILY Thyroid #30 Ref 0 TAB


Lisinopril (Lisinopril) 20 Mg Tab


20 MG PO DAILY Ref 0 TAB


Lisinopril (Lisinopril) 10 Mg Tab


10 MG PO HS Ref 0 TAB


Probiotic Product (Acidophilus) 1 Chew





 


Discontinued Medications:  


Hydrocodone ER (Hysingla ER) 60 Mg Neal


1 TAB PO DAILY


Vitamin E (E-400) 400 Unit Cap


1 UNITS PO DAILY











Gerhard Munguia Apr 16, 2017 23:31

## 2017-06-28 ENCOUNTER — HOSPITAL ENCOUNTER (OUTPATIENT)
Dept: HOSPITAL 17 - HSDC | Age: 72
Discharge: HOME | End: 2017-06-28
Attending: SURGERY
Payer: MEDICARE

## 2017-06-28 VITALS — BODY MASS INDEX: 34.78 KG/M2 | WEIGHT: 203.71 LBS | HEIGHT: 64 IN

## 2017-06-28 VITALS
TEMPERATURE: 98 F | RESPIRATION RATE: 18 BRPM | DIASTOLIC BLOOD PRESSURE: 67 MMHG | HEART RATE: 90 BPM | OXYGEN SATURATION: 95 % | SYSTOLIC BLOOD PRESSURE: 137 MMHG

## 2017-06-28 VITALS
OXYGEN SATURATION: 94 % | TEMPERATURE: 98.2 F | RESPIRATION RATE: 18 BRPM | HEART RATE: 88 BPM | SYSTOLIC BLOOD PRESSURE: 142 MMHG | DIASTOLIC BLOOD PRESSURE: 83 MMHG

## 2017-06-28 DIAGNOSIS — M65.331: ICD-10-CM

## 2017-06-28 DIAGNOSIS — M65.311: ICD-10-CM

## 2017-06-28 DIAGNOSIS — Z01.818: ICD-10-CM

## 2017-06-28 DIAGNOSIS — J44.9: ICD-10-CM

## 2017-06-28 DIAGNOSIS — G56.01: Primary | ICD-10-CM

## 2017-06-28 DIAGNOSIS — M65.341: ICD-10-CM

## 2017-06-28 DIAGNOSIS — M65.321: ICD-10-CM

## 2017-06-28 LAB
BASOPHILS # BLD AUTO: 0.1 TH/MM3 (ref 0–0.2)
BASOPHILS NFR BLD: 0.9 % (ref 0–2)
EOSINOPHIL # BLD: 0.1 TH/MM3 (ref 0–0.4)
EOSINOPHIL NFR BLD: 1.8 % (ref 0–4)
ERYTHROCYTE [DISTWIDTH] IN BLOOD BY AUTOMATED COUNT: 14.8 % (ref 11.6–17.2)
HCT VFR BLD CALC: 44.9 % (ref 35–46)
HEMO FLAGS: (no result)
LYMPHOCYTES # BLD AUTO: 2.4 TH/MM3 (ref 1–4.8)
LYMPHOCYTES NFR BLD AUTO: 37.6 % (ref 9–44)
MCH RBC QN AUTO: 28 PG (ref 27–34)
MCHC RBC AUTO-ENTMCNC: 33.1 % (ref 32–36)
MCV RBC AUTO: 84.6 FL (ref 80–100)
MONOCYTES NFR BLD: 10.3 % (ref 0–8)
NEUTROPHILS # BLD AUTO: 3.2 TH/MM3 (ref 1.8–7.7)
NEUTROPHILS NFR BLD AUTO: 49.4 % (ref 16–70)
PLATELET # BLD: 202 TH/MM3 (ref 150–450)
RBC # BLD AUTO: 5.3 MIL/MM3 (ref 4–5.3)
WBC # BLD AUTO: 6.4 TH/MM3 (ref 4–11)

## 2017-06-28 PROCEDURE — 85025 COMPLETE CBC W/AUTO DIFF WBC: CPT

## 2017-06-28 PROCEDURE — 26055 INCISE FINGER TENDON SHEATH: CPT

## 2017-06-28 PROCEDURE — 01810 ANES PX NRV MUSC F/ARM WRST: CPT

## 2017-06-28 PROCEDURE — 64721 CARPAL TUNNEL SURGERY: CPT

## 2017-06-29 NOTE — MP
cc:

REMY RYDER III, M.D.

****

 

 

DATE OF SURGERY

6/28/2017

 

PREOPERATIVE DIAGNOSIS

1. Right carpal tunnel syndrome.

2. Right thumb, index, middle and ring finger trigger fingers.

 

PROCEDURE PERFORMED

1. Right open carpal tunnel release.

2. Right thumb, index finger, middle finger and ring finger A1 pulley release,

     all separate incisions.

 

SURGEON

Remy Ryder III, MD

 

PROCEDURE IN DETAIL

The patient was brought to the operating room, placed supine on the operating

table. After the correct side and site of surgery were verified by members of

each team in the room multiple times including the patient, myself and after

adequate preop markings and preoperative written consent were verified by

everyone and after adequate preoperative time-out was performed to everyone's

satisfaction, after adequate IV sedation had been achieved the right upper

extremity was prepped and draped in the traditional sterile surgical fashion.

A 50/50 mixture of 2% plain lidocaine with 0.5% plain Marcaine was infiltrated

in the skin and subcutaneous tissue at the base of the palm and into the palm

overlying the A1 pulleys of the thumb, index, middle and ring fingers.  The

limb was exsanguinated with an Ace wrap and a highly placed well-padded

axillary tourniquet was inflated to 250 mmHg for approximately 18 minutes. A

longitudinally oriented incision at the base of the palm being carried down

through the skin and subcutaneous tissue. Palmar fascia was retracted in

opposite directions. The transverse carpal ligament was identified and divided

in its midline from its proximal most to its distal most extents completely

freeing up the carpal tunnel contents. They were examined and found to be in

continuity but pale with a moderately hypertrophic tenosynovium but otherwise

no anatomic abnormalities were identified.  No mass effect was identified.

Thorough irrigation with  saline was performed. The skin edges were

reapproximated using running 4-0 nylon suture.

 

Attention was then paid to the ring finger.  The transverse incision was made

in the distal palmar flexion crease over the A1 pulley.  Blunt dissection was

performed.  Bipolar electrocautery was used to control subcutaneous bleeders.

The A1 pulleys identified and divided in its midline from its proximal most to

its distal-most extent, completely freeing the tendon.  The flexor tendons were

examined and found to be non-compromised.  There are no other anatomic

abnormalities.  Thorough irrigation was performed with  saline and the skin

edges were reapproximated using running 4-0 nylon suture.

 

The identical procedure was then performed for the third A1 pulley, the second

A1 pulley, and the thumb A1 pulley.  All with identical findings.

 

The hand and arm were thoroughly cleansed and dried.  Betadine Adaptic

dressings were applied on top of the wounds followed by a bulky soft dressing.

Bulky circumferential dressing was then applied using Ace wrap in the usual

fashion.  The axillary tourniquet was released and then the hand, all the

fingers on the right including the thumb became immediately soft, pink and warm

and had brisk capillary refill of less than 2 seconds.  The patient was

awakened from anesthesia and transported to the Post Anesthesia Care Unit awake

and in stable condition at the end of the case. Sponge, needle, instrument

counts were correct at the end of the case as reported by nurses in the room.

 

 

 

                              _________________________________

                              MD GABBIE Hutchinson III/STEPHANE

D:  6/28/2017/11:42 AM

T:  6/29/2017/10:05 PM

Visit #:  [id number]

Job #:  [id number]

## 2017-10-05 ENCOUNTER — HOSPITAL ENCOUNTER (OUTPATIENT)
Dept: HOSPITAL 17 - PHSDC | Age: 72
Discharge: HOME | End: 2017-10-05
Attending: SURGERY
Payer: MEDICARE

## 2017-10-05 VITALS
DIASTOLIC BLOOD PRESSURE: 83 MMHG | SYSTOLIC BLOOD PRESSURE: 164 MMHG | RESPIRATION RATE: 16 BRPM | TEMPERATURE: 98.1 F | HEART RATE: 72 BPM | OXYGEN SATURATION: 95 %

## 2017-10-05 VITALS — BODY MASS INDEX: 34.25 KG/M2 | HEIGHT: 64 IN | WEIGHT: 200.62 LBS

## 2017-10-05 DIAGNOSIS — K21.9: ICD-10-CM

## 2017-10-05 DIAGNOSIS — M65.342: ICD-10-CM

## 2017-10-05 DIAGNOSIS — M65.322: ICD-10-CM

## 2017-10-05 DIAGNOSIS — Z79.899: ICD-10-CM

## 2017-10-05 DIAGNOSIS — G56.02: Primary | ICD-10-CM

## 2017-10-05 DIAGNOSIS — E03.9: ICD-10-CM

## 2017-10-05 DIAGNOSIS — J45.909: ICD-10-CM

## 2017-10-05 DIAGNOSIS — L72.0: ICD-10-CM

## 2017-10-05 DIAGNOSIS — M65.332: ICD-10-CM

## 2017-10-05 DIAGNOSIS — D36.12: ICD-10-CM

## 2017-10-05 DIAGNOSIS — E66.9: ICD-10-CM

## 2017-10-05 DIAGNOSIS — M65.352: ICD-10-CM

## 2017-10-05 DIAGNOSIS — I10: ICD-10-CM

## 2017-10-05 LAB
ERYTHROCYTE [DISTWIDTH] IN BLOOD BY AUTOMATED COUNT: 13.9 % (ref 11.6–17.2)
HCT VFR BLD CALC: 43.9 % (ref 35–46)
MCH RBC QN AUTO: 28.9 PG (ref 27–34)
MCHC RBC AUTO-ENTMCNC: 33.3 % (ref 32–36)
MCV RBC AUTO: 86.7 FL (ref 80–100)
PLATELET # BLD: 199 TH/MM3 (ref 150–450)
RBC # BLD AUTO: 5.06 MIL/MM3 (ref 4–5.3)
REVIEW FLAG: (no result)
WBC # BLD AUTO: 8.4 TH/MM3 (ref 4–11)

## 2017-10-05 PROCEDURE — 26055 INCISE FINGER TENDON SHEATH: CPT

## 2017-10-05 PROCEDURE — 88305 TISSUE EXAM BY PATHOLOGIST: CPT

## 2017-10-05 PROCEDURE — 64721 CARPAL TUNNEL SURGERY: CPT

## 2017-10-05 PROCEDURE — 85027 COMPLETE CBC AUTOMATED: CPT

## 2017-10-05 PROCEDURE — 88341 IMHCHEM/IMCYTCHM EA ADD ANTB: CPT

## 2017-10-05 PROCEDURE — 01810 ANES PX NRV MUSC F/ARM WRST: CPT

## 2017-10-05 PROCEDURE — 88342 IMHCHEM/IMCYTCHM 1ST ANTB: CPT

## 2017-10-05 PROCEDURE — 88304 TISSUE EXAM BY PATHOLOGIST: CPT

## 2017-10-05 PROCEDURE — 11421 EXC H-F-NK-SP B9+MARG 0.6-1: CPT

## 2017-10-05 PROCEDURE — 88307 TISSUE EXAM BY PATHOLOGIST: CPT

## 2017-10-05 PROCEDURE — 36415 COLL VENOUS BLD VENIPUNCTURE: CPT

## 2017-10-05 NOTE — MP
cc:

REMY RYDER III, M.D.

****

 

 

DATE OF SURGERY: 10/05/2017

 

PREOPERATIVE DIAGNOSIS

1. Left carpal tunnel syndrome.

2. Left index finger trigger finger, left middle finger trigger finger, left

     ring finger trigger finger, left small finger trigger finger, left index

     finger mass, left ring finger mass.

 

PROCEDURE

1. Left open carpal tunnel release.

2. Left index finger A1 pulley release.

3. Left middle finger A1 pulley release.

4. Left ring finger A1 pulley release.

5. Left small finger A1 pulley release.

6. Left index finger mass excisional biopsy.

7. Left ring finger mass excisional biopsy.

 

SURGEON

Remy Ryder III, MD

 

PROCEDURE

The patient was brought to the operating room and placed supine on the

operating table. After the correct site and side of surgery were verified by

members of each team in the room multiple times including the patient, myself

and after adequate preoperative markings, preoperative written consent were

verified by everyone and after adequate preoperative time-out was performed to

everyone's satisfaction, after adequate general anesthesia had been achieved

the left upper extremity was prepped and draped in traditional sterile surgical

fashion.  A 50/50 mixture of 2% plain lidocaine, 0.5% plain Marcaine was

infiltrated into the carpal tunnel and the areas of the planned incision. The

limb was exsanguinated with a gentle Ace wrap and highly placed, well-padded

axillary tourniquet was placed around the patient's upper arm where she showed

it would be okay to put it, it was inflated to 200 mmHg and kept in place for

40 total minutes. The carpal tunnel was addressed first and a longitudinally

oriented incision within the skin crease at the base of the palm was made and

carried down through skin and subcutaneous tissue.  Blunt dissection was

performed and the  transverse carpal ligament was identified, transected in the

midline in its entirety from proximal most to its distal-most extents.  The

median nerve was examined and was found to have a fibrous sheath surrounding

it, so this was opened and an internal neurolysis was performed. The nerve

bundles and ____ appeared to be in continuity, otherwise there was no mass

effect or any other obvious abnormality.  Thorough irrigation with  saline

was performed and the skin edges were reapproximated using running 4-0 Nylon

suture.

 

Through three separate incisions the A1 pulleys of the index, middle, ring and

small fingers were then completely released with the fourth and fifth being

done through one combined small incision.  These were all placed within skin

flexion creases.  Bipolar electrocautery was used as needed.  Examination

proximally and distally in all fingers did not reveal any crossing bands of

tissue. The tendons were in continuity otherwise non-compromised but there was

a significantly hypertrophic synovial layer around each of the tendons but they

moved freely and easily after the A1 pulleys were opened.  There were no other

anatomic abnormalities.  Thorough irrigation was performed. Skin edges were all

reapproximated using interrupted and running 4-0 Nylon sutures.

 

The index finger mass at the tip was then excised in oval-shaped incision and

this was passed off the field in its entirety.  There was no encroachment into

the mass itself and the internal aspect of the mass were never opened.

Thorough irrigation was performed and running 5-0 Nylon suture was used to

close this wound.

 

The ring finger was then tended to and through a small sarabjit Rabia style

incision, the mass was excised in its entirety.  Bipolar electrocautery was

used to dissect it from the base of the wound, but it did not go into the

flexor sheath or involved any neurovascular bundles.  There were no other

anatomic  abnormalities. The skin edges were then reapproximated using running

5-0 Nylon suture after a thorough irrigation with  saline was performed.  The

hand and arm were thoroughly cleansed and dried.  Betadine Adaptic dressings

were applied on top of all the wounds followed by a bulky soft dressing.

Hemostasis was present.  The axillary tourniquet was released and the hand and

all the fingers became immediately soft, pink and warm and had brisk capillary

refill of less than 2 seconds.  The patient was able to freely wiggle all of

her fingers when she was awoken.  She was transferred to the Post Anesthesia

Care Unit awake and in stable condition at the end of the case. Sponge, needle

and instrument counts were correct at the end of the case as reported by the

nurses in the room.

 

 

 

                              _________________________________

                              MD GABBIE Hutchinson III/MARCOS

D:  10/5/2017/12:34 PM

T:  10/5/2017/1:06 PM

Visit #:  [id number]

Job #:  [id number]

## 2018-05-09 ENCOUNTER — HOSPITAL ENCOUNTER (OUTPATIENT)
Dept: HOSPITAL 17 - PHSDC | Age: 73
Discharge: HOME | End: 2018-05-09
Attending: SURGERY
Payer: MEDICARE

## 2018-05-09 VITALS — HEART RATE: 107 BPM

## 2018-05-09 VITALS — BODY MASS INDEX: 34.15 KG/M2 | WEIGHT: 200 LBS | HEIGHT: 64 IN

## 2018-05-09 VITALS
OXYGEN SATURATION: 98 % | SYSTOLIC BLOOD PRESSURE: 147 MMHG | RESPIRATION RATE: 16 BRPM | DIASTOLIC BLOOD PRESSURE: 79 MMHG | TEMPERATURE: 98.9 F | HEART RATE: 90 BPM

## 2018-05-09 DIAGNOSIS — Z01.810: ICD-10-CM

## 2018-05-09 DIAGNOSIS — T84.84XD: Primary | ICD-10-CM

## 2018-05-09 DIAGNOSIS — M25.522: ICD-10-CM

## 2018-05-09 DIAGNOSIS — J44.9: ICD-10-CM

## 2018-05-09 PROCEDURE — 01740 ANES OPN/ARTHRS PX ELBW NOS: CPT

## 2018-05-09 PROCEDURE — 20680 REMOVAL OF IMPLANT DEEP: CPT

## 2018-05-09 PROCEDURE — 93005 ELECTROCARDIOGRAM TRACING: CPT

## 2018-05-09 PROCEDURE — 76000 FLUOROSCOPY <1 HR PHYS/QHP: CPT

## 2018-05-09 NOTE — MP
cc:

Remy Ryder MD, Louis C MD

****

 

 

DATE OF OPERATION:

05/09/2018

 

PREOPERATIVE DIAGNOSIS:

Prominent retained painful hardware, left ulna/elbow.

 

PROCEDURE PERFORMED:

1.  Exploration and removal, under fluoroscopic guidance, of 4 painful

screws in the left ulna/elbow.

2.  Use of image intensifier.

 

SURGEON:

Remy Ryder III, MD

 

INDICATIONS:

The patient was brought to the operating room and placed supine on the

operating table.  After the correct site and side of surgery were 

verified by members of each team in the room multiple times including 

the patient and myself and after adequate preoperative markings and 

preoperative written consent was verified by everyone,  after adequate

preoperative timeout was performed to everyone's satisfaction, after 

adequate general anesthesia had been achieved, the left upper 

extremity was prepped and draped in traditional sterile surgical 

fashion.  The mini C-arm was used to verify the site of procedure and 

to verify the screws that were backing out.  A 50/50 mixture of 2% 

plain lidocaine, 0.5% plain Marcaine was infiltrated into the skin and

subcutaneous tissue.  A longitudinal incision 1/3 of the length of her

previous incision utilizing the old scar was made and sharp and blunt 

dissection was performed down to the areas of the hardware where the 

screws were backing out and were prominent.  These were then removed, 

four separate screws from 4 separate locations.  A thorough irrigation

was performed.  There was no evidence of any instability on 

examination.  The bones looked to be completely healed to remove the 

remainder of the hardware for no reason was a much more extensive 

undertaking and the patient and I agreed to not do this.  The 

tourniquet was never used.  Thorough irrigation was used again.  Skin 

edges were reapproximated using  running 4-0 nylon sutures.  The deep 

tissue was reapproximated over the hardware using 3-0 Vicryl sutures. 

The hand and arm were thoroughly cleansed and dried.  Betadine, 

Adaptic dressing was applied on top of the wound, followed by a bulky 

soft dressing.  The fingers were all soft, pink and warm the entire 

time.  The patient was awakened from anesthesia and transported to the

Postanesthesia Care Unit awake in stable condition at the end of the 

case.  Sponge, needle, instrument counts were correct at the end of 

the case as reported by the nurses in the room.

 

 

__________________________________

Remy MD BROCK Crespo

D: 05/09/2018, 06:18 PM

T: 05/09/2018, 06:42 PM

Visit #: [id number]

Job #: [id number]

## 2018-05-10 NOTE — EKG
Date Performed: 2018       Time Performed: 09:02:11

 

PTAGE:      73 years

 

EKG:      Sinus rhythm 

 

 NORMAL ECG Since the 

 

 PREVIOUS TRACING            , no significant change noted PREVIOUS TRACIN2017 14.04

 

DOCTOR:   Melvin Humphrey  Interpretating Date/Time  05/10/2018 19:32:08

## 2019-01-01 NOTE — PD.[email or web address].PN
Subjective


Post Op Day #:  3


Subjective Remarks


Patient sitting upright in chair. Admits left hip pain controlled. Admits to 

walking successfully. Admits to constipation. Feels ready for discharge today.





Objective


Vitals





 Vital Signs








  Date Time  Temp Pulse Resp B/P Pulse Ox O2 Delivery O2 Flow Rate FiO2


 


4/14/17 23:30 98.3 82 17 149/79 92   


 


4/14/17 20:10 98.4 82 17 150/83 94   


 


4/14/17 19:06      Room Air  


 


4/14/17 16:02 98.9 70 16 134/79 94   


 


4/14/17 12:12 99.6 76 16 146/73 93   


 


4/14/17 07:55     97   21


 


4/14/17 07:46 98.1 80 18 163/93 93   








 I/O








 4/14/17 4/14/17 4/14/17 4/15/17 4/15/17 4/15/17





 07:00 15:00 23:00 07:00 15:00 23:00


 


Intake Total 240 ml  480 ml 480 ml  


 


Balance 240 ml  480 ml 480 ml  


 


      


 


Intake Oral 240 ml  480 ml 480 ml  


 


# Voids 2  1 4  


 


# Bowel Movements 0  0 0  








Result Diagram:  


4/14/17 0516                                                                   

             4/14/17 0516





Procedures


Left EUSEBIO


Objective Remarks


The patient's dressing is C/D/I.  Calf is soft and nontender.  EHL/TA/G intact.

  2+ pedal pulse.  Minimal swelling. Mild area of erythema noted anteriorly. No 

warmth. + SILT.





Assessment & Plan


Ortho Post Op Day #:  3


Problem List:  


Assessment and Plan


POD #3:  Left EUSEBIO





1.  WBAT LLE


2.  Lovenox for DVT prophylaxis


3.  Ice to the left hip PRN


4.  Anticipatory discharge to SNF ([facility]) today


5. Senakot ordered for constipation.








Rebecca Beck Apr 15, 2017 07:39 Term , current hospitalization